# Patient Record
Sex: MALE | Race: WHITE | NOT HISPANIC OR LATINO | Employment: OTHER | URBAN - NONMETROPOLITAN AREA
[De-identification: names, ages, dates, MRNs, and addresses within clinical notes are randomized per-mention and may not be internally consistent; named-entity substitution may affect disease eponyms.]

---

## 2017-07-25 ENCOUNTER — HISTORY (OUTPATIENT)
Dept: INTERNAL MEDICINE | Facility: OTHER | Age: 62
End: 2017-07-25

## 2017-07-25 ENCOUNTER — OFFICE VISIT - GICH (OUTPATIENT)
Dept: INTERNAL MEDICINE | Facility: OTHER | Age: 62
End: 2017-07-25

## 2017-07-25 DIAGNOSIS — L71.9 ROSACEA: ICD-10-CM

## 2017-07-25 DIAGNOSIS — K21.9 GASTRO-ESOPHAGEAL REFLUX DISEASE WITHOUT ESOPHAGITIS: ICD-10-CM

## 2017-07-25 DIAGNOSIS — F52.8 OTHER SEXUAL DYSFUNCTION NOT DUE TO A SUBSTANCE OR KNOWN PHYSIOLOGICAL CONDITION: ICD-10-CM

## 2017-07-25 DIAGNOSIS — M19.90 OSTEOARTHRITIS: ICD-10-CM

## 2017-07-25 DIAGNOSIS — F17.200 NICOTINE DEPENDENCE, UNCOMPLICATED: ICD-10-CM

## 2017-07-25 DIAGNOSIS — Z00.00 ENCOUNTER FOR GENERAL ADULT MEDICAL EXAMINATION WITHOUT ABNORMAL FINDINGS: ICD-10-CM

## 2017-07-25 LAB
A/G RATIO - HISTORICAL: 1.3 (ref 1–2)
ALBUMIN SERPL-MCNC: 4.1 G/DL (ref 3.5–5.7)
ALP SERPL-CCNC: 79 IU/L (ref 34–104)
ALT (SGPT) - HISTORICAL: 27 IU/L (ref 7–52)
ANION GAP - HISTORICAL: 10 (ref 5–18)
AST SERPL-CCNC: 16 IU/L (ref 13–39)
BILIRUB SERPL-MCNC: 0.8 MG/DL (ref 0.3–1)
BUN SERPL-MCNC: 16 MG/DL (ref 7–25)
BUN/CREAT RATIO - HISTORICAL: 18
CALCIUM SERPL-MCNC: 9.7 MG/DL (ref 8.6–10.3)
CHLORIDE SERPLBLD-SCNC: 103 MMOL/L (ref 98–107)
CHOL/HDL RATIO - HISTORICAL: 7.11
CHOLESTEROL TOTAL: 199 MG/DL
CO2 SERPL-SCNC: 24 MMOL/L (ref 21–31)
CREAT SERPL-MCNC: 0.91 MG/DL (ref 0.7–1.3)
GFR IF NOT AFRICAN AMERICAN - HISTORICAL: >60 ML/MIN/1.73M2
GLOBULIN - HISTORICAL: 3.1 G/DL (ref 2–3.7)
GLUCOSE SERPL-MCNC: 98 MG/DL (ref 70–105)
HDLC SERPL-MCNC: 28 MG/DL (ref 23–92)
LDLC SERPL CALC-MCNC: 132 MG/DL
NON-HDL CHOLESTEROL - HISTORICAL: 171 MG/DL
PATIENT STATUS - HISTORICAL: ABNORMAL
POTASSIUM SERPL-SCNC: 4.2 MMOL/L (ref 3.5–5.1)
PROT SERPL-MCNC: 7.2 G/DL (ref 6.4–8.9)
PSA TOTAL (DIAGNOSTIC) - HISTORICAL: 1.29 NG/ML
SODIUM SERPL-SCNC: 137 MMOL/L (ref 133–143)
TRIGL SERPL-MCNC: 194 MG/DL

## 2017-07-26 LAB — HEPATITIS C ANTIBODY CATEGORY - HISTORICAL: NORMAL

## 2017-11-07 ENCOUNTER — HISTORY (OUTPATIENT)
Dept: EMERGENCY MEDICINE | Facility: OTHER | Age: 62
End: 2017-11-07

## 2017-11-08 ENCOUNTER — OFFICE VISIT - GICH (OUTPATIENT)
Dept: ORTHOPEDICS | Facility: OTHER | Age: 62
End: 2017-11-08

## 2017-11-08 ENCOUNTER — HISTORY (OUTPATIENT)
Dept: ORTHOPEDICS | Facility: OTHER | Age: 62
End: 2017-11-08

## 2017-11-08 DIAGNOSIS — S82.291A OTHER FRACTURE OF SHAFT OF RIGHT TIBIA, INITIAL ENCOUNTER FOR CLOSED FRACTURE: ICD-10-CM

## 2017-11-10 ENCOUNTER — AMBULATORY - GICH (OUTPATIENT)
Dept: SCHEDULING | Facility: OTHER | Age: 62
End: 2017-11-10

## 2017-11-17 ENCOUNTER — OFFICE VISIT - GICH (OUTPATIENT)
Dept: ORTHOPEDICS | Facility: OTHER | Age: 62
End: 2017-11-17

## 2017-11-17 ENCOUNTER — HISTORY (OUTPATIENT)
Dept: ORTHOPEDICS | Facility: OTHER | Age: 62
End: 2017-11-17

## 2017-11-17 ENCOUNTER — HOSPITAL ENCOUNTER (OUTPATIENT)
Dept: RADIOLOGY | Facility: OTHER | Age: 62
End: 2017-11-17
Attending: ORTHOPAEDIC SURGERY

## 2017-11-17 DIAGNOSIS — M89.8X6 OTHER SPECIFIED DISORDERS OF BONE, LOWER LEG: ICD-10-CM

## 2017-11-17 DIAGNOSIS — S82.291D: ICD-10-CM

## 2017-11-22 ENCOUNTER — AMBULATORY - GICH (OUTPATIENT)
Dept: ORTHOPEDICS | Facility: OTHER | Age: 62
End: 2017-11-22

## 2017-11-22 DIAGNOSIS — M89.8X6 OTHER SPECIFIED DISORDERS OF BONE, LOWER LEG: ICD-10-CM

## 2017-11-29 ENCOUNTER — HOSPITAL ENCOUNTER (OUTPATIENT)
Dept: RADIOLOGY | Facility: OTHER | Age: 62
End: 2017-11-29
Attending: ORTHOPAEDIC SURGERY

## 2017-11-29 ENCOUNTER — HISTORY (OUTPATIENT)
Dept: ORTHOPEDICS | Facility: OTHER | Age: 62
End: 2017-11-29

## 2017-11-29 ENCOUNTER — OFFICE VISIT - GICH (OUTPATIENT)
Dept: ORTHOPEDICS | Facility: OTHER | Age: 62
End: 2017-11-29

## 2017-11-29 DIAGNOSIS — S82.291D: ICD-10-CM

## 2017-11-29 DIAGNOSIS — M89.8X6 OTHER SPECIFIED DISORDERS OF BONE, LOWER LEG: ICD-10-CM

## 2017-12-13 ENCOUNTER — AMBULATORY - GICH (OUTPATIENT)
Dept: ORTHOPEDICS | Facility: OTHER | Age: 62
End: 2017-12-13

## 2017-12-13 DIAGNOSIS — M89.8X6 OTHER SPECIFIED DISORDERS OF BONE, LOWER LEG: ICD-10-CM

## 2017-12-26 ENCOUNTER — OFFICE VISIT - GICH (OUTPATIENT)
Dept: ORTHOPEDICS | Facility: OTHER | Age: 62
End: 2017-12-26

## 2017-12-26 ENCOUNTER — HISTORY (OUTPATIENT)
Dept: ORTHOPEDICS | Facility: OTHER | Age: 62
End: 2017-12-26

## 2017-12-26 ENCOUNTER — HOSPITAL ENCOUNTER (OUTPATIENT)
Dept: RADIOLOGY | Facility: OTHER | Age: 62
End: 2017-12-26
Attending: ORTHOPAEDIC SURGERY

## 2017-12-26 DIAGNOSIS — M89.8X6 OTHER SPECIFIED DISORDERS OF BONE, LOWER LEG: ICD-10-CM

## 2017-12-26 DIAGNOSIS — S82.291D: ICD-10-CM

## 2017-12-27 NOTE — PROGRESS NOTES
Patient Information     Patient Name MRN Sex Martin Burgess [id number] Male 1955      Progress Notes by Matt Velásquez DO at 2017  7:45 AM     Author:  Matt Velásquez DO Service:  (none) Author Type:  PHYS- Osteopathic     Filed:  2017  8:29 AM Encounter Date:  2017 Status:  Signed     :  Matt Velásquez DO (PHYS- Osteopathic)            Martin Bryant was seen in consultation for Dr. Torres from the emergency room for a chief complaint of right lower leg injury     CHIEF COMPLAINT: Martin Bryant is a 62 y.o.  male  Chief Complaint     Patient presents with       Consult      Tib fracture doi-17       HISTORY OF PRESENTING INJURY:  62-year-old male relates he was on a ladder yesterday that was up against a tree. He was trying to move a bird feeder. The ladder slipped and he ended up falling. Both lower legs banged against the ladder. Pain to the right lower leg. Presented to the emergency room and x-rays demonstrated a nondisplaced fracture of the proximal third tibia with no angulation. The fibula was intact. The patient also had a small abrasion to the left lower leg shin area. He was put in a above knee Ortho-Glass splint for the right leg. Using crutches. Ice and elevation during the night.  Minimal to no pain complaint of the right lower leg or calf. The patient has been moving the toes. He also had some increased soreness of the left shoulder. The patient has been seen previously by Dr. Merino for his shoulder concerns and was told that was worn out. The patient had a prescription for pain medication but didn't need to take any last night.    REVIEW OF SYSTEMS:  Constitutional:  Denies constitutional problems  Cardiovascular: normal  Respiratory: normal    The review of systems as documented in the HPI and on the intake questionnaire, completed by the patient 2017, have been reviewed by myself and the pertinent positives and negatives addressed.  The  "remainder of the complete review of systems was non-contributory.    (PFSH) PAST, FAMILY, and/or SOCIAL HISTORY:    PAST MEDICAL HISTORY:  Past Medical History:     Diagnosis  Date     Dyspepsia      ERECTILE DYSFUNCTION, NON-ORGANIC      Rosacea      TOBACCO ABUSE        PAST SURGICAL HISTORY:  Past Surgical History:      Procedure  Laterality Date     HERNIA REPAIR      Bilateral       PAROTIDECTOMY Left 2012    Benign         ALLERGIES:  No Known Allergies    CURRENT MEDICATIONS:  Current Outpatient Prescriptions       Medication  Sig Dispense Refill     glucosamine-chondroitin, 500-400 mg, (COSAMIN /400) 500-400 mg cap Take 1 capsule by mouth 3 times daily.  0     omeprazole (PRILOSEC) 20 mg capsule Take 1 capsule by mouth once daily before a meal.  0     oxyCODONE-acetaminophen, 5-325 mg, (PERCOCET) 5-325 mg per tablet Take 1-2 tablets by mouth every 4 hours if needed  for Pain Max acetaminophen dose: 4000mg in 24 hrs. 15 tablet 0     No current facility-administered medications for this visit.      Medications have been reviewed by me and are current to the best of my knowledge and ability.      FAMILY HISTORY:  Family History       Problem   Relation Age of Onset     Cancer-prostate  Father      Cancer  Sister      Bone         Additional [facility] information documented on the intake form completed by the patient 11/8/2017 was reviewed by myself.    PHYSICAL EXAM:   Ht 1.676 m (5' 6\")  Wt 79.8 kg (176 lb)  BMI 28.41 kg/m2 Body mass index is 28.41 kg/(m^2).    General Appearance: Pleasant male in good appearance, mood and affect.  Alert and orientated times three ( time, date and location).    Examination of Right leg     Examination of the right leg demonstrates a posterior splint extending above the knee and down to the foot. The knee is slightly flexed. Appears well padded.   No complaint of knee pain with palpation around the patella or sides of the knee.   No complaint of calf pain with palpation " around the side of the splint.   I did not remove the splint at this time.   Comfortable active motion of the toes.   Sensory intact.   Capillary refill less than 2 seconds.     Left lower leg demonstrates a small superficial abrasion around the mid anterior tibia. Comfortable knee motion and ankle motion.     Xray/MRI/MRA:  Radiographic images where independently reviewed and discussed with the patient.   Attending Doctor: MALU TUCKER ([id number])  :       ESTELLE WOOD ([id number])  Report Date:       11/07/2017 11:18:09  Report Status:       Final  ======================= Begin of Report Content ======================    PROCEDURE: XR TIBIA AND FIBULA 2 VIEWS RIGHT  HISTORY: FALL; LEG PAIN/PROBLEM; SHOULDER PAIN/PROBLEM; .  COMPARISON: None.  TECHNIQUE: 2 views of the right tibia and fibula were obtained.  FINDINGS: There is a nondisplaced fracture of the proximal tibia with transverse and vertical components. No additional fractures are seen.  IMPRESSION: Nondisplaced proximal tibial fracture.  Electronically Signed By: Estelle Wood M.D. on 11/7/2017 11:13 AM    IMPRESSION:  Right proximal third tibial fracture, stellate pattern, nondisplaced, no angulation.  History of fall from a ladder about 6-8 feet in height, yesterday.  Superficial left lower leg abrasion    PLAN:  The patient was left in the current splint this morning.  Recommend continued use of crutches and balance weight only on the foot.  Recommend referral to [facility] orthotics for application of a right long leg brace which can be hinged at the knee. Include a foot and ankle section to provide rotational stability.  Continue to ice and elevate the fracture site.  Discussed nonsurgical management.  Recheck progress in 1 week and also check the brace at that time.  Discussed the use of over-the-counter nonnarcotic medication.  Questions answered  plan for limited weight-bearing at least 6-8 weeks. Also plan to continue the brace during that  time pending healing on x-ray. May require additional time in the brace.    Matt Velásquez D.O., FDARLINE.O.AColinO.  Orthopedic Surgeon    [address] [address]  Phone [phone]  Fax [phone]    8:03 AM 11/8/2017

## 2017-12-27 NOTE — PROGRESS NOTES
"Patient Information     Patient Name MRN Sex Martin Burgess [id number] Male 1955      Progress Notes by Matt Velásquez DO at 2017  9:00 AM     Author:  Matt Velásquez DO Service:  (none) Author Type:  PHYS- Osteopathic     Filed:  2017 12:41 PM Encounter Date:  2017 Status:  Signed     :  Matt Velásquez DO (PHYS- Osteopathic)            PROGRESS NOTE    SUBJECTIVE:  Martin Bryant is here for recheck of a right lower leg tibia fracture.    HPI: History of injury 10 days ago. The patient presents with his long leg brace with full extension. Patient relates minimal discomfort to the fracture site area. He has been wearing the brace. Patient relates she was informed he could take the lower portion off at nighttime but he does have some discomfort because he might turn his foot. .  Otherwise doing okay with the brace on.     Review of Systems:  Constitutional: Denies constitutional problems    PFSH:  No change in information. See earlier PFSH questionnaire completed by the patient on initial visit.    OBJECTIVE:  /78  Pulse 69  Ht 1.676 m (5' 6\")  Wt 79.8 kg (176 lb)  BMI 28.41 kg/m2 Body mass index is 28.41 kg/(m^2).  Patient is alert and orientated x3 and answers questions appropriately.    Knee exam: Left   mild swelling to the left lower leg. The brace appears to be fitting well.    Radiographic images where independently reviewed and discussed with the patient.   X RAY: X-rays today demonstrates the proximal third tibial shaft fracture with no significant displacement or angulation.    ASSESSMENT:  10 days status post right tibial shaft fracture, proximal third with no displacement or angulation.  Currently in a long leg brace    PLAN:  Recommend trying to leave the lower foot section on even at nighttime provided there is no irritation or rubbing to the skin.  This would provide more rotational stability and safety for the lower leg.  Patient thinks he can " leave it on at nighttime as well.  Plan to recheck and x-ray in 2 weeks. Continue to keep weight off the foot.  Continue with a long-leg brace.    Matt Velásquez D.O.  Orthopedic Surgeon    [address] [address]  Phone [phone]  Fax [phone]    11:39 AM 11/17/2017

## 2017-12-28 NOTE — PROGRESS NOTES
"Patient Information     Patient Name MRN Sex Martin Burgess [id number] Male 1955      Progress Notes by Matt Velásquez DO at 2017  3:30 PM     Author:  Matt Velásquez DO Service:  (none) Author Type:  PHYS- Osteopathic     Filed:  2017  3:58 PM Encounter Date:  2017 Status:  Signed     :  Matt Velásquez DO (PHYS- Osteopathic)            PROGRESS NOTE    SUBJECTIVE:  Martin Bryant is here for recheck of a right tibia fracture    HPI: Approximately 3 weeks after a right proximal third tibial shaft fracture. Injury on 17. Recheck and x-ray today. The patient has been wearing the long-leg brace all the time. No significant pain complaint to the leg. Using crutches and restricted weightbearing on the left foot. Mostly balance weight. .    Review of Systems:  Constitutional: Denies constitutional problems    PFSH:  No change in information. See earlier PFSH questionnaire completed by the patient on initial visit.    OBJECTIVE:  /88  Pulse 88  Ht 1.676 m (5' 6\")  Wt 79.8 kg (176 lb)  BMI 28.41 kg/m2 Body mass index is 28.41 kg/(m^2).  Patient is alert and orientated x3 and answers questions appropriately.    Left lower leg:  Mild soft tissue swelling over the proximal third of the anterior tibia. No pain with palpation. Comfortable knee motion. The brace was removed. The calf is supple. Comfortable ankle motion. Good alignment of the leg.    Radiographic images where independently reviewed and discussed with the patient.   X RAY: X-ray of the right tibia and fibula demonstrates the proximal third tibial shaft fracture with no displacement or angulation. Stellate fracture pattern. Small amount of callus formation along the fracture site on one of the x-rays.    ASSESSMENT:  3 weeks status post right proximal tibial shaft fracture. Stellate fracture pattern in good alignment.  Clinically the patient appears to be doing well with the long-leg brace and foot " extension.    PLAN:  Continue with the current long-leg brace. Continue with crutches.  Balance weight on the right foot until recheck in about 4 weeks.  Recheck and x-ray in 4 weeks right tibia.  Possible progression of weightbearing at that point but will continue the brace.    Matt Velásquez D.O.  Orthopedic Surgeon    [address] [address]  Phone [phone]  Fax [phone]    3:54 PM 11/29/2017

## 2017-12-28 NOTE — PROGRESS NOTES
Patient Information     Patient Name MRN Martin Chambers [id number] Male 1955      Progress Notes by Obi Khan MD at 2017 12:40 PM     Author:  Obi Khan MD Service:  (none) Author Type:  Physician     Filed:  2017  1:27 PM Encounter Date:  2017 Status:  Signed     :  Obi Khan MD (Physician)            SUBJECTIVE:    Martin Bryant is a 62 y.o. male who presents for comprehensive review of their multiple medical problems and review of medications, renewal of medications and update on necessary health maintenance issues.      HPI Comments: He is here today for complete evaluation. He has not been in for some time to have things checked. He feels well in most respects. One of his biggest problems is with his shoulders and other generalized arthritis. He does take glucosamine which may be helps a little bit but not completely.    He continues to smoke cigarettes, 1 pack per day. I recommended that he consider screening CT scan for lung cancer. He will consider this. Of course I also recommended that he quit smoking. He has never had a colonoscopy. In addition, he has fairly steady and regular problems with reflux disease that is controlled with omeprazole. I told him that I recommended that he have a colonoscopy as well as EGD for further evaluation. He will consider this.    Other than that he feels well with no complaints or concerns. He would like to have some lab work done. He is due for a tetanus booster. I spent time today reconciling medications as well as updating his past medical history, past surgical history, family history and social history.      No Known Allergies,   Current Outpatient Prescriptions     Medication  Sig     glucosamine-chondroitin, 500-400 mg, (COSAMIN /400) 500-400 mg cap Take 1 capsule by mouth 3 times daily.     omeprazole (PRILOSEC) 20 mg capsule Take 1 capsule by mouth once daily before a meal.     No current  facility-administered medications for this visit.      Medications have been reviewed by me and are current to the best of my knowledge and ability. ,   Past Medical History:     Diagnosis  Date     Dyspepsia      ERECTILE DYSFUNCTION, NON-ORGANIC      Rosacea      TOBACCO ABUSE    ,   Patient Active Problem List      Diagnosis Date Noted     Gastroesophageal reflux disease without esophagitis 2017     Osteoarthritis 2017     Tear of right rotator cuff 2015     Left rotator cuff tear 2015     TOBACCO ABUSE      ROSACEA      ERECTILE DYSFUNCTION, NON-ORGANIC    ,   Past Surgical History:      Procedure  Laterality Date     HERNIA REPAIR      Bilateral       PAROTIDECTOMY Left     Benign      and   Social History       Substance Use Topics         Smoking status:   Current Every Day Smoker     Packs/day:  1.00     Years:  40.00     Types:  Cigarettes     Smokeless tobacco:   Never Used     Alcohol use   No      Comment: rare use.      Family Status     Relation  Status     Father  at age 70s     Mother Alive     Sister      Sister Alive     Sister Alive     Social History     Social History        Marital status:       Spouse name: N/A     Number of children:  N/A     Years of education:  N/A     Social History Main Topics         Smoking status:   Current Every Day Smoker     Packs/day:  1.00     Years:  40.00     Types:  Cigarettes     Smokeless tobacco:   Never Used     Alcohol use   No      Comment: rare use.      Drug use:   No     Sexual activity:   Not on file     Other Topics  Concern     Not on file      Social History Narrative      and works at [employer].  Grown children and 2 grandchildren.    Tobacco use 1 ppd - ongoing.       REVIEW OF SYSTEMS:  Review of Systems   Constitutional: Negative for chills, diaphoresis, fever, malaise/fatigue and weight loss.   HENT: Negative for congestion, ear pain, nosebleeds, sore throat and tinnitus.    Eyes:  "Negative for blurred vision, double vision, photophobia, pain, discharge and redness.   Respiratory: Negative for cough, hemoptysis, sputum production, shortness of breath and wheezing.    Cardiovascular: Negative for chest pain, palpitations, orthopnea, claudication, leg swelling and PND.   Gastrointestinal: Negative for abdominal pain, blood in stool, constipation, diarrhea, heartburn, nausea and vomiting.   Genitourinary: Negative for dysuria, flank pain and hematuria.   Musculoskeletal: Negative for back pain, joint pain, myalgias and neck pain.   Skin: Negative for itching and rash.   Neurological: Negative for dizziness, tingling, tremors, speech change, loss of consciousness, weakness and headaches.   Psychiatric/Behavioral: Negative for depression, hallucinations, memory loss, substance abuse and suicidal ideas. The patient is not nervous/anxious.        OBJECTIVE:  /64  Pulse 63  Ht 1.68 m (5' 6.14\")  Wt 80.2 kg (176 lb 12.8 oz)  BMI 28.41 kg/m2    EXAM:   Physical Exam   Constitutional: He is oriented to person, place, and time and well-developed, well-nourished, and in no distress. No distress.   HENT:   Head: Normocephalic and atraumatic.   Right Ear: Tympanic membrane and external ear normal.   Left Ear: Tympanic membrane and external ear normal.   Nose: Nose normal.   Mouth/Throat: Oropharynx is clear and moist and mucous membranes are normal. No oropharyngeal exudate.   Eyes: Conjunctivae are normal. Pupils are equal, round, and reactive to light. No scleral icterus.   Neck: Normal range of motion. Neck supple. Normal carotid pulses, no hepatojugular reflux and no JVD present. Carotid bruit is not present. No tracheal deviation and no edema present. No thyroid mass and no thyromegaly present.   Cardiovascular: Normal rate, regular rhythm, normal heart sounds and intact distal pulses.  Exam reveals no gallop and no friction rub.    No murmur heard.  Pulmonary/Chest: Effort normal. No " respiratory distress. He has decreased breath sounds. He has no wheezes. He has no rhonchi. He has no rales. He exhibits no tenderness.   Abdominal: Soft. Bowel sounds are normal. He exhibits no distension and no mass. There is no hepatosplenomegaly. There is no tenderness. There is no rebound and no guarding. Hernia confirmed negative in the right inguinal area and confirmed negative in the left inguinal area.   Genitourinary: Rectum normal, prostate normal, testes/scrotum normal and penis normal.   Genitourinary Comments: Prostate 2+, no nodularity   Musculoskeletal: Normal range of motion. He exhibits no edema or tenderness.   Lymphadenopathy:     He has no cervical adenopathy.   Neurological: He is alert and oriented to person, place, and time. He has normal motor skills. He displays normal reflexes. No cranial nerve deficit. He exhibits normal muscle tone. Gait normal. Coordination normal.   Skin: Skin is warm and dry. No rash noted. He is not diaphoretic. No cyanosis or erythema. No pallor. Nails show no clubbing.   Psychiatric: Mood, memory, affect and judgment normal.   Nursing note and vitals reviewed.      ASSESSMENT/PLAN:    ICD-10-CM    1. Gastroesophageal reflux disease without esophagitis K21.9 OMNI TDAP VACCINE IM   2. TOBACCO ABUSE F17.200    3. Osteoarthritis, unspecified osteoarthritis type, unspecified site M19.90    4. Rosacea L71.9    5. ERECTILE DYSFUNCTION, NON-ORGANIC F52.8    6. Health care maintenance Z00.00 COMPLETE METABOLIC PANEL      LIPID PANEL      ANTI HCV      PSA, TOTAL      URINALYSIS W REFLEX MICROSCOPIC IF POSITIVE        Plan:  He appears to be stable at this point in time. Exam is fairly unremarkable. Boostrix given today. Complete lab drawn and pending, I will send him a letter with the results. Strongly encouraged him to discontinue smoking. Recommended CT scan for lung cancer screening. Recommended EGD as well as colonoscopy. He will discuss these recommendations with his  wife and will let me know if he would like me to order them.

## 2017-12-29 ENCOUNTER — AMBULATORY - GICH (OUTPATIENT)
Dept: SCHEDULING | Facility: OTHER | Age: 62
End: 2017-12-29

## 2017-12-30 NOTE — NURSING NOTE
Patient Information     Patient Name MRN Martin Chambers [id number] Male 1955      Nursing Note by Gosselin, Norma J at 2017  7:45 AM     Author:  Gosselin, Norma J Service:  (none) Author Type:  (none)     Filed:  2017  7:59 AM Encounter Date:  2017 Status:  Signed     :  Gosselin, Norma J            Consult right leg injury. doi-17  Norma J Gosselin LPN....................  2017   7:58 AM

## 2017-12-30 NOTE — NURSING NOTE
Patient Information     Patient Name MRN Martin Chambers [id number] Male 1955      Nursing Note by Chaya Fabian at 2017 12:40 PM     Author:  Chaya Fabian Service:  (none) Author Type:  (none)     Filed:  2017 12:55 PM Encounter Date:  2017 Status:  Signed     :  Chaya Fabian            Pt is here for yearly physical  Chaya Fabian LPN 2017 12:46 PM

## 2017-12-30 NOTE — NURSING NOTE
Patient Information     Patient Name MRN Sex Martin Burgess [id number] Male 1955      Nursing Note by Gosselin, Norma J at 2017  3:30 PM     Author:  Gosselin, Norma J Service:  (none) Author Type:  (none)     Filed:  2017  3:19 PM Encounter Date:  2017 Status:  Signed     :  Gosselin, Norma J            Follow up Right Tib/Fib DOI-17  Norma J Gosselin LPN....................  2017   3:18 PM

## 2017-12-30 NOTE — NURSING NOTE
Patient Information     Patient Name MRN Martin Chambers [id number] Male 1955      Nursing Note by Chaya Fabian at 2017  9:00 AM     Author:  Chaya Fabian Service:  (none) Author Type:  (none)     Filed:  2017  8:58 AM Encounter Date:  2017 Status:  Signed     :  Chaya Fabian            Patient is here today for follow up of right tibia fracture   DOI 17  Chaya Fabian LPN 2017 8:57 AM

## 2018-01-18 ENCOUNTER — AMBULATORY - GICH (OUTPATIENT)
Dept: ORTHOPEDICS | Facility: OTHER | Age: 63
End: 2018-01-18

## 2018-01-18 DIAGNOSIS — S82.291D: ICD-10-CM

## 2018-01-27 VITALS
DIASTOLIC BLOOD PRESSURE: 64 MMHG | HEIGHT: 66 IN | SYSTOLIC BLOOD PRESSURE: 128 MMHG | BODY MASS INDEX: 28.42 KG/M2 | HEART RATE: 63 BPM | WEIGHT: 176.8 LBS

## 2018-01-27 VITALS
HEIGHT: 66 IN | HEART RATE: 69 BPM | BODY MASS INDEX: 28.28 KG/M2 | DIASTOLIC BLOOD PRESSURE: 78 MMHG | WEIGHT: 176 LBS | SYSTOLIC BLOOD PRESSURE: 130 MMHG

## 2018-01-27 VITALS
HEART RATE: 88 BPM | HEIGHT: 66 IN | SYSTOLIC BLOOD PRESSURE: 136 MMHG | DIASTOLIC BLOOD PRESSURE: 88 MMHG | WEIGHT: 176 LBS | BODY MASS INDEX: 28.28 KG/M2

## 2018-01-27 VITALS
HEART RATE: 68 BPM | SYSTOLIC BLOOD PRESSURE: 110 MMHG | WEIGHT: 176 LBS | BODY MASS INDEX: 28.28 KG/M2 | DIASTOLIC BLOOD PRESSURE: 60 MMHG | HEIGHT: 66 IN

## 2018-01-31 ENCOUNTER — AMBULATORY - GICH (OUTPATIENT)
Dept: SCHEDULING | Facility: OTHER | Age: 63
End: 2018-01-31

## 2018-01-31 ENCOUNTER — HISTORY (OUTPATIENT)
Dept: ORTHOPEDICS | Facility: OTHER | Age: 63
End: 2018-01-31

## 2018-01-31 ENCOUNTER — OFFICE VISIT - GICH (OUTPATIENT)
Dept: ORTHOPEDICS | Facility: OTHER | Age: 63
End: 2018-01-31

## 2018-01-31 ENCOUNTER — HOSPITAL ENCOUNTER (OUTPATIENT)
Dept: RADIOLOGY | Facility: OTHER | Age: 63
End: 2018-01-31
Attending: ORTHOPAEDIC SURGERY

## 2018-01-31 DIAGNOSIS — S82.291D: ICD-10-CM

## 2018-02-02 ENCOUNTER — AMBULATORY - GICH (OUTPATIENT)
Dept: SCHEDULING | Facility: OTHER | Age: 63
End: 2018-02-02

## 2018-02-09 VITALS
HEART RATE: 80 BPM | WEIGHT: 176 LBS | HEIGHT: 66 IN | BODY MASS INDEX: 28.28 KG/M2 | SYSTOLIC BLOOD PRESSURE: 134 MMHG | DIASTOLIC BLOOD PRESSURE: 84 MMHG

## 2018-02-09 VITALS
DIASTOLIC BLOOD PRESSURE: 80 MMHG | HEART RATE: 88 BPM | SYSTOLIC BLOOD PRESSURE: 128 MMHG | WEIGHT: 176 LBS | BODY MASS INDEX: 28.41 KG/M2

## 2018-02-12 NOTE — PROGRESS NOTES
"Patient Information     Patient Name MRN Sex Martin Burgess [id number] Male 1955      Progress Notes by Matt Velásquez DO at 2017 10:15 AM     Author:  Matt Velásquez DO Service:  (none) Author Type:  PHYS- Osteopathic     Filed:  2017 10:39 AM Encounter Date:  2017 Status:  Signed     :  Matt Velásquez DO (PHYS- Osteopathic)            PROGRESS NOTE    SUBJECTIVE:  Martin Bryant is here for recheck of a right tibia fracture.     HPI: History of injury about 7 weeks ago on . Patient has been treated nonsurgically for a nondisplaced fracture of the proximal third of the tibia. Using his knee brace. Mostly balance weight on the foot at times. Otherwise using crutches. . decrease in soft tissue swelling of the shin area.   No pain complaint to the fracture site.   Comfortable with knee movement today. X-rays taken.     Review of Systems:  Constitutional: Denies constitutional problems    PFSH:  No change in information. See earlier PFSH questionnaire completed by the patient on initial visit.    OBJECTIVE:  /84 (Cuff Site: Right Arm, Position: Sitting, Cuff Size: Adult Regular)  Pulse 80  Ht 1.676 m (5' 6\")  Wt 79.8 kg (176 lb)  BMI 28.41 kg/m2 Body mass index is 28.41 kg/(m^2).  Patient is alert and orientated x3 and answers questions appropriately.    Knee exam: Right   examination of the right knee demonstrates no effusion or redness.  Right tibia and lower leg demonstrates good alignment. Decrease in soft tissue swelling over the anterior tibia and lower leg.  No significant pain with palpation around the proximal third of the tibia at the fracture site.  Mild lower extremity edema.    Radiographic images where independently reviewed and discussed with the patient.   X RAY: X-rays today compared to previous films demonstrates increased callus formation and healing at the fracture site. Some increased resorption at the fracture site as " well.  Increased callus seen on multiple views. Good alignment with no displacement or angulation.    ASSESSMENT:  7 weeks right proximal third tibia shaft fracture, nondisplaced  increased healing and callus on x-ray comparing to prior films. Still good alignment.    PLAN:  Continue with the functional brace. I open the brace and currently set at 10-80  range of motion. Patient is comfortable in the chair bending the knee past 90  today.  Recommendations to continue with the knee brace at all times.  Okay for balance weight and gradually progress to partial weightbearing but definitely still use the crutches and no full weightbearing on the foot at this time.    The patient's wife relates they were told it would be healed in 6-8 weeks and she expressed displeasure that he is not all better yet and she needs to do the work at home that he normally takes care of.  Discussed with the patient and his wife he has good healing callus on x-ray at 7 weeks today but the fracture is not fully healed. He still needs to use the brace and recommend keeping most of the weight off the foot.   He should have increased healing at about 12 weeks after injury and hopefully should be able to put more weight on the foot at that time. However fracture takes additional months for continued healing.  At this time we want to avoid twisting or turning on it to avoid any displacement or angulation to occur.  Patient relates he has some paperwork that he needs to drop off.  Recommend recheck and x-ray of the right tibia in 4-5 weeks.  Questions answered.    Matt Velásquez D.O.  Orthopedic Surgeon    [address] [place] [address]  Phone [phone]  Fax [phone]    10:30 AM 12/26/2017

## 2018-02-13 NOTE — NURSING NOTE
Patient Information     Patient Name MRN Martin Chambers [id number] Male 1955      Nursing Note by Amber Hudson at 2018  3:30 PM     Author:  Amber Hudson Service:  (none) Author Type:  (none)     Filed:  2018  3:15 PM Encounter Date:  2018 Status:  Signed     :  Amber Hudson            Patient is here for a follow up on his right tib/fib injury.  DOI: 17  Amber Hudson LPN .......2018 3:15 PM

## 2018-02-13 NOTE — PROGRESS NOTES
Patient Information     Patient Name MRN Sex Martin Burgess [id number] Male 1955      Progress Notes by Matt Velásquez DO at 2018  3:30 PM     Author:  Matt Velásquez DO Service:  (none) Author Type:  PHYS- Osteopathic     Filed:  2018  4:00 PM Encounter Date:  2018 Status:  Signed     :  Matt Velásquez DO (PHYS- Osteopathic)            PROGRESS NOTE    SUBJECTIVE:  Martin Bryant is here for recheck of a right proximal third tibia fracture    HPI: 12 weeks status post right proximal tibial shaft fracture, nondisplaced. Treated nonsurgically with immobilization.   The patient has been using the long-leg hinged knee brace with extension down to the foot.   No complaint of pain to the fracture site. Comfortable with the knee.   The patient has been limiting weightbearing on the foot. Partial weightbearing but on occasion puts full weight on it without crutches. Comfortable with no pain. .    The patient has been off work at this time because of the injury. 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     Review of Systems:  Constitutional: Denies constitutional problems    PFSH:  No change in information. See earlier PFSH questionnaire completed by the patient on initial visit.    OBJECTIVE:  /80 (Cuff Site: Right Arm, Position: Sitting, Cuff Size: Adult Regular)  Pulse 88  Wt 79.8 kg (176 lb)  BMI 28.41 kg/m2 Body mass index is 28.41 kg/(m^2).  Patient is alert and orientated x3 and answers questions appropriately.    Right lower leg:  Mild to moderate soft tissue swelling over the proximal middle third of the tibia. No pain with palpation. The calf is supple.  Comfortable active and passive motion of the right ankle and knee.    Radiographic images where independently reviewed and discussed with the patient.   X RAY: X-rays of the right tibia and fibula  today demonstrates increased healing at the fracture site on multiple views with cortical callus and thickening. Fracture is still visible.    Attending Doctor: ALINA BLANCO ([id number])  :       BOLA CLANCY ([id number])  Report Date:       01/31/2018 15:41:19  Report Status:       Final  ======================= Begin of Report Content ======================    PROCEDURE: XR TIBIA AND FIBULA 2 VIEWS RIGHT  HISTORY: Other closed fracture of shaft of right tibia with routine healing, subsequent encounter.  COMPARISON: 11/7/2017 through 12/26/2017  TECHNIQUE: 4 views of the right tibia and fibula.  FINDINGS: The previously described proximal right tibial diaphyseal fracture is redemonstrated, again with bulky periosteal bone formation. Some endosteal bone information with slight indistinctness of the fracture line is seen, particularly on the sagittal view. No other fracture is seen.  IMPRESSION: Healing proximal right tibial fracture.  Electronically Signed By: Jabier Clancy on 1/31/2018 3:37 PM    ASSESSMENT:  Right proximal third tibial fracture. History of injury 12 weeks ago  increased healing on multiple views.  Overall good alignment with no displacement or angulation.    PLAN:  Patient will continue with the long-leg hinged knee brace with extension down to the foot.  Gradually progress walking activities on the foot and wean from the crutches during the next month.  Recommend using the brace with walking activities.  Okay to remove the brace indoors if comfortable and at nighttime.  Avoid jumping activities. Avoid twisting and turning on the leg.  Okay to progress steps and stairs.    Discussed work requirements. Okay for sitdown work and standing and walking activities but recommend no stair climbing. The patient relates needing to go up and down 50-70 steps at work.  Recommend no steps and stairs at work over the next 2 weeks until he becomes more comfortable with stairs at home.    Patient plans  on dropping off some paperwork to be filled out for work.  Questions answered.  Recheck and x-ray of the right tibia/fibula in 6 weeks.    Matt Velásquez D.O.  Orthopedic Surgeon    [address] [place] [address]  Phone [phone]  Fax [phone]    3:54 PM 1/31/2018

## 2018-02-23 ENCOUNTER — DOCUMENTATION ONLY (OUTPATIENT)
Dept: FAMILY MEDICINE | Facility: OTHER | Age: 63
End: 2018-02-23

## 2018-02-23 PROBLEM — F52.8 PSYCHOSEXUAL DYSFUNCTION WITH INHIBITED SEXUAL EXCITEMENT: Status: ACTIVE | Noted: 2018-02-23

## 2018-02-23 PROBLEM — M19.90 OSTEOARTHROSIS: Status: ACTIVE | Noted: 2017-07-25

## 2018-02-23 PROBLEM — L71.9 ROSACEA: Status: ACTIVE | Noted: 2018-02-23

## 2018-02-23 PROBLEM — K21.9 GASTROESOPHAGEAL REFLUX DISEASE WITHOUT ESOPHAGITIS: Status: ACTIVE | Noted: 2017-07-25

## 2018-02-23 PROBLEM — Z72.0 TOBACCO ABUSE: Status: ACTIVE | Noted: 2018-02-23

## 2018-02-23 PROBLEM — S82.201D CLOSED FRACTURE OF SHAFT OF RIGHT TIBIA WITH ROUTINE HEALING: Status: ACTIVE | Noted: 2017-12-29

## 2018-02-23 PROBLEM — M89.8X6 PAIN OF RIGHT TIBIA: Status: ACTIVE | Noted: 2017-12-13

## 2018-02-23 RX ORDER — SODIUM PHOSPHATE,MONO-DIBASIC 19G-7G/118
1 ENEMA (ML) RECTAL 3 TIMES DAILY
COMMUNITY
Start: 2012-08-30 | End: 2018-06-04

## 2018-03-07 DIAGNOSIS — S82.291D OTHER CLOSED FRACTURE OF SHAFT OF RIGHT TIBIA WITH ROUTINE HEALING, SUBSEQUENT ENCOUNTER: Primary | ICD-10-CM

## 2018-03-19 DIAGNOSIS — S82.291D OTHER CLOSED FRACTURE OF SHAFT OF RIGHT TIBIA WITH ROUTINE HEALING, SUBSEQUENT ENCOUNTER: Primary | ICD-10-CM

## 2018-04-16 DIAGNOSIS — S82.291D OTHER CLOSED FRACTURE OF SHAFT OF RIGHT TIBIA WITH ROUTINE HEALING, SUBSEQUENT ENCOUNTER: Primary | ICD-10-CM

## 2018-04-17 ENCOUNTER — HOSPITAL ENCOUNTER (OUTPATIENT)
Dept: GENERAL RADIOLOGY | Facility: OTHER | Age: 63
Discharge: HOME | End: 2018-04-17
Attending: ORTHOPAEDIC SURGERY | Admitting: ORTHOPAEDIC SURGERY
Payer: COMMERCIAL

## 2018-04-17 ENCOUNTER — OFFICE VISIT (OUTPATIENT)
Dept: ORTHOPEDICS | Facility: OTHER | Age: 63
End: 2018-04-17
Attending: ORTHOPAEDIC SURGERY
Payer: COMMERCIAL

## 2018-04-17 VITALS
HEART RATE: 64 BPM | DIASTOLIC BLOOD PRESSURE: 80 MMHG | SYSTOLIC BLOOD PRESSURE: 124 MMHG | BODY MASS INDEX: 28.28 KG/M2 | HEIGHT: 66 IN | WEIGHT: 176 LBS

## 2018-04-17 DIAGNOSIS — S82.291D OTHER CLOSED FRACTURE OF SHAFT OF RIGHT TIBIA WITH ROUTINE HEALING, SUBSEQUENT ENCOUNTER: ICD-10-CM

## 2018-04-17 DIAGNOSIS — S82.291D: Primary | ICD-10-CM

## 2018-04-17 PROCEDURE — 99213 OFFICE O/P EST LOW 20 MIN: CPT | Performed by: ORTHOPAEDIC SURGERY

## 2018-04-17 PROCEDURE — 73590 X-RAY EXAM OF LOWER LEG: CPT | Mod: RT

## 2018-04-17 ASSESSMENT — PAIN SCALES - GENERAL: PAINLEVEL: NO PAIN (0)

## 2018-04-17 NOTE — PROGRESS NOTES
"PROGRESS NOTE    SUBJECTIVE:  Martin Bryant is here for recheck of a right tibia fracture.     HPI: Patient relates he has been doing well over the past month.  No pain to the right lower leg.  Performing normal outdoor activities.  He has an outdoor woodstove..    Review of Systems:  Constitutional: Denies constitutional problems    PFSH:  No change in information. See earlier PFSH questionnaire completed by the patient on initial visit.    OBJECTIVE:  /80  Pulse 64  Ht 1.676 m (5' 6\")  Wt 79.8 kg (176 lb)  BMI 28.41 kg/m2Body mass index is 28.41 kg/(m^2).  Patient is alert and orientated x3 and answers questions appropriately.    Right lower leg:  Comfortable motion of the right knee.  Ambulates unassisted.  No pain with knee motion.  No significant swelling to the right lower leg.  Comfortable ankle motion.    Radiographic images were independently reviewed and discussed with the patient.   X RAY:    X-rays of the right tibia/fibula today demonstrates increased bridging healing across the fracture site on the tibia with continued good alignment.    ASSESSMENT:  Right proximal third tibia fracture with increased healing on x-ray compared to prior films.  Clinically the patient is doing well.  History of injury November 7, 2017.    PLAN:  Discussion included review of x-rays.  Discussed with the patient increased healing on x-ray.  Good alignment.  Recommendations to continue with a measure of caution regarding outdoor activities.  No additional x-rays ordered at this time.  Questions answered and the patient will follow-up if needed.    Matt Velásquez D.O.  Orthopedic Surgeon    [facility]  [address] [place] [address]  Phone [phone]  Fax [phone]    4/17/2018              "

## 2018-04-17 NOTE — MR AVS SNAPSHOT
"              After Visit Summary   4/17/2018    Martin Bryant    MRN: [id number]           Patient Information     Date Of Birth          1955        Visit Information        Provider Department      4/17/2018 8:30 AM Matt Velásquez DO [facility]        Today's Diagnoses     Other fracture of shaft of right tibia, subsequent encounter for closed fracture with routine healing    -  1       Follow-ups after your visit        Follow-up notes from your care team     Return if symptoms worsen or fail to improve.      Future tests that were ordered for you today     Open Future Orders        Priority Expected Expires Ordered    XR Tibia & Fibula Right 2 Views Routine 4/17/2018 4/16/2019 4/16/2018            Who to contact     If you have questions or need follow up information about today's clinic visit or your schedule please contact [facility] directly at [phone].  Normal or non-critical lab and imaging results will be communicated to you by GenVaulthart, letter or phone within 4 business days after the clinic has received the results. If you do not hear from us within 7 days, please contact the clinic through GenVaulthart or phone. If you have a critical or abnormal lab result, we will notify you by phone as soon as possible.  Submit refill requests through Sometrics or call your pharmacy and they will forward the refill request to us. Please allow 3 business days for your refill to be completed.          Additional Information About Your Visit        MyChart Information     Sometrics lets you send messages to your doctor, view your test results, renew your prescriptions, schedule appointments and more. To sign up, go to [email or web address] . Click on \"Log in\" on the left side of the screen, which will take you to the Welcome page. Then click on \"Sign up Now\" on the right side of the page.     You will be asked to enter the access code listed below, as well as some " "personal information. Please follow the directions to create your username and password.     Your access code is: [email or web address]  Expires: 2018  9:11 AM     Your access code will  in 90 days. If you need help or a new code, please call your [place] clinic or [phone].        Care EveryWhere ID     This is your Care EveryWhere ID. This could be used by other organizations to access your [facility] medical records  [id number]        Your Vitals Were     Pulse Height BMI (Body Mass Index)             64 1.676 m (5' 6\") 28.41 kg/m2          Blood Pressure from Last 3 Encounters:   18 124/80   18 128/80   17 134/84    Weight from Last 3 Encounters:   18 79.8 kg (176 lb)   18 79.8 kg (176 lb)   17 79.8 kg (176 lb)              Today, you had the following     No orders found for display       Primary Care Provider Office Phone # Fax #    Obi Khan [phone] [address] [facility] [address]        Equal Access to Services     [facility] CenterSAAR AH: Hadii alex munizo Somarie, waaxda luqadaha, qaybta kaalmada adeegyada, racquel naqvi. So [facility] [phone].    ATENCIÓN: Si habla español, tiene a garcia disposición servicios gratuitos de asistencia lingüística. Ll[facility] [phone].    We comply with applicable federal civil rights laws and [place] laws. We do not discriminate on the basis of race, color, national origin, age, disability, sex, sexual orientation, or gender identity.            Thank you!     Thank you for choosing [facility] AND [facility]  for your care. Our goal is always to provide you with excellent care. Hearing back from our patients is one way we can continue to improve our services. Please take a few minutes to complete the written survey that you may receive in the mail after your visit with us. Thank you!             Your Updated Medication List - Protect others around you: Learn " how to safely use, store and throw away your medicines at www.disposemymeds.org.          This list is accurate as of 4/17/18  9:11 AM.  Always use your most recent med list.                   Brand Name Dispense Instructions for use Diagnosis    glucosamine-chondroitin 500-400 MG Caps per capsule      Take 1 capsule by mouth 3 times daily        omeprazole 20 MG CR capsule    priLOSEC     Take 20 mg by mouth daily

## 2018-04-23 ENCOUNTER — OFFICE VISIT (OUTPATIENT)
Dept: FAMILY MEDICINE | Facility: OTHER | Age: 63
End: 2018-04-23
Attending: INTERNAL MEDICINE
Payer: COMMERCIAL

## 2018-04-23 VITALS
BODY MASS INDEX: 30.59 KG/M2 | WEIGHT: 189.5 LBS | HEART RATE: 80 BPM | DIASTOLIC BLOOD PRESSURE: 82 MMHG | SYSTOLIC BLOOD PRESSURE: 138 MMHG

## 2018-04-23 DIAGNOSIS — R42 VERTIGO: Primary | ICD-10-CM

## 2018-04-23 LAB
ANION GAP SERPL CALCULATED.3IONS-SCNC: 5 MMOL/L (ref 3–14)
BASOPHILS # BLD AUTO: 0.1 10E9/L (ref 0–0.2)
BASOPHILS NFR BLD AUTO: 0.7 %
BUN SERPL-MCNC: 18 MG/DL (ref 7–25)
CALCIUM SERPL-MCNC: 9.5 MG/DL (ref 8.6–10.3)
CHLORIDE SERPL-SCNC: 104 MMOL/L (ref 98–107)
CO2 SERPL-SCNC: 28 MMOL/L (ref 21–31)
CREAT SERPL-MCNC: 0.87 MG/DL (ref 0.7–1.3)
DIFFERENTIAL METHOD BLD: NORMAL
EOSINOPHIL # BLD AUTO: 0.3 10E9/L (ref 0–0.7)
EOSINOPHIL NFR BLD AUTO: 4.2 %
ERYTHROCYTE [DISTWIDTH] IN BLOOD BY AUTOMATED COUNT: 13.6 % (ref 10–15)
GFR SERPL CREATININE-BSD FRML MDRD: 89 ML/MIN/1.7M2
GLUCOSE SERPL-MCNC: 110 MG/DL (ref 70–105)
HCT VFR BLD AUTO: 48.2 % (ref 40–53)
HGB BLD-MCNC: 16.6 G/DL (ref 13.3–17.7)
IMM GRANULOCYTES # BLD: 0 10E9/L (ref 0–0.4)
IMM GRANULOCYTES NFR BLD: 0.4 %
LYMPHOCYTES # BLD AUTO: 1.6 10E9/L (ref 0.8–5.3)
LYMPHOCYTES NFR BLD AUTO: 19.3 %
MCH RBC QN AUTO: 29.2 PG (ref 26.5–33)
MCHC RBC AUTO-ENTMCNC: 34.4 G/DL (ref 31.5–36.5)
MCV RBC AUTO: 85 FL (ref 78–100)
MONOCYTES # BLD AUTO: 0.5 10E9/L (ref 0–1.3)
MONOCYTES NFR BLD AUTO: 6.6 %
NEUTROPHILS # BLD AUTO: 5.6 10E9/L (ref 1.6–8.3)
NEUTROPHILS NFR BLD AUTO: 68.8 %
PLATELET # BLD AUTO: 257 10E9/L (ref 150–450)
POTASSIUM SERPL-SCNC: 4.3 MMOL/L (ref 3.5–5.1)
RBC # BLD AUTO: 5.68 10E12/L (ref 4.4–5.9)
SODIUM SERPL-SCNC: 137 MMOL/L (ref 134–144)
WBC # BLD AUTO: 8.1 10E9/L (ref 4–11)

## 2018-04-23 PROCEDURE — 85025 COMPLETE CBC W/AUTO DIFF WBC: CPT | Performed by: NURSE PRACTITIONER

## 2018-04-23 PROCEDURE — 36415 COLL VENOUS BLD VENIPUNCTURE: CPT | Performed by: NURSE PRACTITIONER

## 2018-04-23 PROCEDURE — 99213 OFFICE O/P EST LOW 20 MIN: CPT | Performed by: NURSE PRACTITIONER

## 2018-04-23 PROCEDURE — 80048 BASIC METABOLIC PNL TOTAL CA: CPT | Performed by: NURSE PRACTITIONER

## 2018-04-23 RX ORDER — MECLIZINE HYDROCHLORIDE 25 MG/1
25 TABLET ORAL EVERY 6 HOURS PRN
Qty: 30 TABLET | Refills: 0 | Status: SHIPPED | OUTPATIENT
Start: 2018-04-23 | End: 2018-05-11

## 2018-04-23 ASSESSMENT — PAIN SCALES - GENERAL: PAINLEVEL: NO PAIN (0)

## 2018-04-23 NOTE — MR AVS SNAPSHOT
After Visit Summary   4/23/2018    Martin Bryant    MRN: [id number]           Patient Information     Date Of Birth          1955        Visit Information        Provider Department      4/23/2018 10:15 AM Bianka Wynne APRN CNP [facility]        Today's Diagnoses     Vertigo    -  1      Care Instructions      Benign Paroxysmal Positional Vertigo     Your health care provider may move your head in certain ways to treat your BPPV.     Benign paroxysmal positional vertigo (BPPV) is a problem with the inner ear. The inner ear contains the vestibular system. This system is what helps you keep your balance. BPPV causes a feeling of spinning. It is a common problem of the vestibular system.  Understanding the vestibular system  The vestibular system of the ear is made up of very tiny parts. They include the utricle, saccule, and semicircular canals. The utricle is a tiny organ that contains calcium crystals. In some people, the crystals can move into the semicircular canals. When this happens, the system no longer works as it should. This causes BPPV. Benign means it is not life threatening. Paroxysmal means it happens suddenly. Positional means that it happens when you move your head. Vertigo is a feeling of spinning.  What causes BPPV?  Causes include injury to your head or neck. Other problems with the vestibular system may cause BPPV. In many people, the cause of BPPV is not known.  Symptoms of BPPV  You many have repeated feelings of spinning (vertigo). The vertigo usually lasts less than 1 minute. Some movements, such as rolling over in bed, can bring on vertigo.  Diagnosing BPPV  Your primary healthcare provider may diagnose and treat your BPPV. Or you may see an ear, nose, and throat doctor (otolaryngologist). In some cases, you may see a nervous system doctor (neurologist).  The healthcare provider will ask about your symptoms and your medical history. He or she  will examine you. You may have hearing and balance tests. As part of the exam, your healthcare provider may have you move your head and body in certain ways. If you have BPPV, the movements can bring on vertigo. Your provider will also look for abnormal movements of your eyes. You may have other tests to check your vestibular or nervous systems.  Treatment for BPPV  Your healthcare provider may try to move the calcium crystals. This is done by having you move your head and neck in certain ways. This treatment is safe and often works well. You may also be told to do these movements at home. You may still have vertigo for a few weeks. Your healthcare provider will recheck your symptoms, usually in about a month. Special physical therapy may also be part of treatment. In rare cases, surgery may be needed for BPPV that does not go away.     When to call the healthcare provider  Call your healthcare provider right away if you have any of these:    Symptoms that do not go away with treatment    Symptoms that get worse    New symptoms   Date Last Reviewed: 5/1/2017 2000-2017 [employer]. [address] [place] [address]. All rights reserved. This information is not intended as a substitute for professional medical care. Always follow your healthcare professional's instructions.                Follow-ups after your visit        Additional Services     PHYSICAL THERAPY REFERRAL       *This therapy referral will be filtered to a centralized scheduling office at [facility] and the patient will receive a call to schedule an appointment at a [place] location most convenient for them. *     [facility] provides Physical Therapy evaluation and treatment and many specialty services across the [place] system.  If requesting a specialty program, please choose from the list below.    If you have not heard from the scheduling office within 2 business days, please  "call [phone] for all locations, with the exception of [place], please call [phone] and [facility], please call [phone]  Treatment: Evaluation & Treatment  Special Instructions/Modalities: none  Special Programs: Balance/Vestibular    Please be aware that coverage of these services is subject to the terms and limitations of your health insurance plan.  Call member services at your health plan with any benefit or coverage questions.      **Note to Provider:  If you are referring outside of [place] for the therapy appointment, please list the name of the location in the \"special instructions\" above, print the referral and give to the patient to schedule the appointment.                  Who to contact     If you have questions or need follow up information about today's clinic visit or your schedule please contact [facility] directly at [phone].  Normal or non-critical lab and imaging results will be communicated to you by Conformiqhart, letter or phone within 4 business days after the clinic has received the results. If you do not hear from us within 7 days, please contact the clinic through Conformiqhart or phone. If you have a critical or abnormal lab result, we will notify you by phone as soon as possible.  Submit refill requests through [employer] or call your pharmacy and they will forward the refill request to us. Please allow 3 business days for your refill to be completed.          Additional Information About Your Visit        Kind[employer]s Information     [employer] lets you send messages to your doctor, view your test results, renew your prescriptions, schedule appointments and more. To sign up, go to [email or web address] . Click on \"Log in\" on the left side of the screen, which will take you to the Welcome page. Then click on \"Sign up Now\" on the right side of the page.     You will be asked to enter the access code listed below, as well as some personal information. Please follow " the directions to create your username and password.     Your access code is: [email or web address]  Expires: 2018  9:11 AM     Your access code will  in 90 days. If you need help or a new code, please call your [place] clinic or [phone].        Care EveryWhere ID     This is your Care EveryWhere ID. This could be used by other organizations to access your [place] medical records  [id number]        Your Vitals Were     Pulse BMI (Body Mass Index)                80 30.59 kg/m2           Blood Pressure from Last 3 Encounters:   18 138/82   18 124/80   18 128/80    Weight from Last 3 Encounters:   18 189 lb 8 oz (86 kg)   18 176 lb (79.8 kg)   18 176 lb (79.8 kg)              We Performed the Following     Basic metabolic panel     CBC and Differential     PHYSICAL THERAPY REFERRAL          Today's Medication Changes          These changes are accurate as of 18 10:31 AM.  If you have any questions, ask your nurse or doctor.               Start taking these medicines.        Dose/Directions    meclizine 25 MG tablet   Commonly known as:  ANTIVERT   Used for:  Vertigo   Started by:  Bianka Wynne APRN CNP        Dose:  25 mg   Take 1 tablet (25 mg) by mouth every 6 hours as needed for dizziness   Quantity:  30 tablet   Refills:  0            Where to get your medicines      These medications were sent to [id number] - [place], [place] - [address]  [address], [facility] [address]     Phone:  [phone]     meclizine 25 MG tablet                Primary Care Provider Office Phone # Fax #    bOi Khan -[phone] [address] [facility] [address]        Equal Access to Services     [facility] HospitalSARA : Mejia Valladares, waaxda luqadaha, qaybta kaalmada marcelina, racquel naqvi. So [facility] [phone].    ATENCIÓN: Si salena mckeon garcia disposición  servicios gratuitos de asistencia lingüística. Ivan renteria [phone].    We comply with applicable federal civil rights laws and [place] laws. We do not discriminate on the basis of race, color, national origin, age, disability, sex, sexual orientation, or gender identity.            Thank you!     Thank you for choosing [facility] AND [facility]  for your care. Our goal is always to provide you with excellent care. Hearing back from our patients is one way we can continue to improve our services. Please take a few minutes to complete the written survey that you may receive in the mail after your visit with us. Thank you!             Your Updated Medication List - Protect others around you: Learn how to safely use, store and throw away your medicines at www.disposemymeds.org.          This list is accurate as of 4/23/18 10:31 AM.  Always use your most recent med list.                   Brand Name Dispense Instructions for use Diagnosis    glucosamine-chondroitin 500-400 MG Caps per capsule      Take 1 capsule by mouth 3 times daily        meclizine 25 MG tablet    ANTIVERT    30 tablet    Take 1 tablet (25 mg) by mouth every 6 hours as needed for dizziness    Vertigo       omeprazole 20 MG CR capsule    priLOSEC     Take 20 mg by mouth daily

## 2018-04-23 NOTE — NURSING NOTE
Patient presents to clinic today for dizziness. It started yesterday when he woke up and improved throughout the day, but woke up with it again this morning.     Kimberly Paula LPN...................4/23/2018  10:14 AM

## 2018-04-23 NOTE — PROGRESS NOTES
HPI:    Martin Bryant is a 63 year old male who presents to clinic today for dizzy spells. He woke up with sx of dizziness yesterday. This did improve but had sx again this morning when he woke up. No loss of balance. The dizziness has been present constant, has nausea. No recent illnesses. No hx of dizziness in the past. Feels dizziness is worse with position changes such as bending forwards. Rolling over in bed last night made the sx worse. He has not taken anything for sx.     Past Medical History:   Diagnosis Date     Epigastric pain     No Comments Provided     Nicotine dependence, uncomplicated     No Comments Provided     Other sexual dysfunction not due to a substance or known physiological condition     No Comments Provided     Rosacea     No Comments Provided       Past Surgical History:   Procedure Laterality Date     OTHER SURGICAL HISTORY      ,HERNIA REPAIR,Bilateral     OTHER SURGICAL HISTORY      2012,COO8827,PAROTIDECTOMY,Left,Benign       Family History   Problem Relation Age of Onset     Prostate Cancer Father      Cancer-prostate     CANCER Sister      Cancer,Bone       Social History     Social History     Marital status:      Spouse name: N/A     Number of children: N/A     Years of education: N/A     Occupational History     Not on file.     Social History Main Topics     Smoking status: Current Every Day Smoker     Packs/day: 0.25     Years: 40.00     Types: Cigarettes     Smokeless tobacco: Never Used     Alcohol use No      Comment: Alcoholic Drinks/day: rare use.     Drug use: No      Comment: Drug use: No     Sexual activity: Not on file     Other Topics Concern     Not on file     Social History Narrative     and works at [employer].  Grown children and 2 grandchildren.  Tobacco use 1 ppd - ongoing.       Current Outpatient Prescriptions   Medication Sig Dispense Refill     glucosamine-chondroitin 500-400 MG CAPS per capsule Take 1 capsule by mouth 3 times daily        meclizine (ANTIVERT) 25 MG tablet Take 1 tablet (25 mg) by mouth every 6 hours as needed for dizziness 30 tablet 0     omeprazole (PRILOSEC) 20 MG CR capsule Take 20 mg by mouth daily         No Known Allergies    ROS:  Pertinent positives and negatives are noted in HPI.    EXAM:  General appearance: well appearing male, in no acute distress  Head: normocephalic, atraumatic  Ears: TM's with cone of light, no erythema, canals clear bilaterally  Eyes: conjunctivae normal, FLAVIO, EOM   Orophayrnx: moist mucous membranes, tonsils without erythema, exudates or petechiae, no post nasal drip seen  Neck: supple without adenopathy  Respiratory: clear to auscultation bilaterally  Cardiac: RRR with no murmurs  Neuro: CN II-XII intact, normal gait  Psychological: normal affect, alert and pleasant    ASSESSMENT AND PLAN:    1. Vertigo      Discussed differentials including inner ear concerns, BPPV, etc. Exam is benign today. Sx consistent with a positional vertigo. Discussed sx management, meclizine for dizziness. Referred to PT for vestibular tx, if sx resolved may cancel. F/u prn.         Bianka Wynne..................4/23/2018 10:12 AM

## 2018-04-23 NOTE — PATIENT INSTRUCTIONS
Benign Paroxysmal Positional Vertigo     Your health care provider may move your head in certain ways to treat your BPPV.     Benign paroxysmal positional vertigo (BPPV) is a problem with the inner ear. The inner ear contains the vestibular system. This system is what helps you keep your balance. BPPV causes a feeling of spinning. It is a common problem of the vestibular system.  Understanding the vestibular system  The vestibular system of the ear is made up of very tiny parts. They include the utricle, saccule, and semicircular canals. The utricle is a tiny organ that contains calcium crystals. In some people, the crystals can move into the semicircular canals. When this happens, the system no longer works as it should. This causes BPPV. Benign means it is not life threatening. Paroxysmal means it happens suddenly. Positional means that it happens when you move your head. Vertigo is a feeling of spinning.  What causes BPPV?  Causes include injury to your head or neck. Other problems with the vestibular system may cause BPPV. In many people, the cause of BPPV is not known.  Symptoms of BPPV  You many have repeated feelings of spinning (vertigo). The vertigo usually lasts less than 1 minute. Some movements, such as rolling over in bed, can bring on vertigo.  Diagnosing BPPV  Your primary healthcare provider may diagnose and treat your BPPV. Or you may see an ear, nose, and throat doctor (otolaryngologist). In some cases, you may see a nervous system doctor (neurologist).  The healthcare provider will ask about your symptoms and your medical history. He or she will examine you. You may have hearing and balance tests. As part of the exam, your healthcare provider may have you move your head and body in certain ways. If you have BPPV, the movements can bring on vertigo. Your provider will also look for abnormal movements of your eyes. You may have other tests to check your vestibular or nervous systems.  Treatment  for BPPV  Your healthcare provider may try to move the calcium crystals. This is done by having you move your head and neck in certain ways. This treatment is safe and often works well. You may also be told to do these movements at home. You may still have vertigo for a few weeks. Your healthcare provider will recheck your symptoms, usually in about a month. Special physical therapy may also be part of treatment. In rare cases, surgery may be needed for BPPV that does not go away.     When to call the healthcare provider  Call your healthcare provider right away if you have any of these:    Symptoms that do not go away with treatment    Symptoms that get worse    New symptoms   Date Last Reviewed: 5/1/2017 2000-2017 [employer]. [address] [place] [address], [place] [address]. All rights reserved. This information is not intended as a substitute for professional medical care. Always follow your healthcare professional's instructions.

## 2018-05-11 ENCOUNTER — HOSPITAL ENCOUNTER (EMERGENCY)
Facility: OTHER | Age: 63
Discharge: HOME | End: 2018-05-12
Attending: FAMILY MEDICINE | Admitting: FAMILY MEDICINE
Payer: COMMERCIAL

## 2018-05-11 DIAGNOSIS — R10.84 ABDOMINAL PAIN, GENERALIZED: ICD-10-CM

## 2018-05-11 LAB
ALBUMIN SERPL-MCNC: 4.1 G/DL (ref 3.5–5.7)
ALP SERPL-CCNC: 81 U/L (ref 34–104)
ALT SERPL W P-5'-P-CCNC: 13 U/L (ref 7–52)
ANION GAP SERPL CALCULATED.3IONS-SCNC: 9 MMOL/L (ref 3–14)
AST SERPL W P-5'-P-CCNC: 16 U/L (ref 13–39)
BASOPHILS # BLD AUTO: 0.1 10E9/L (ref 0–0.2)
BASOPHILS NFR BLD AUTO: 0.3 %
BILIRUB SERPL-MCNC: 0.7 MG/DL (ref 0.3–1)
BUN SERPL-MCNC: 22 MG/DL (ref 7–25)
CALCIUM SERPL-MCNC: 9.8 MG/DL (ref 8.6–10.3)
CHLORIDE SERPL-SCNC: 100 MMOL/L (ref 98–107)
CO2 SERPL-SCNC: 25 MMOL/L (ref 21–31)
CREAT SERPL-MCNC: 1.26 MG/DL (ref 0.7–1.3)
CRP SERPL-MCNC: 0 MG/L
DIFFERENTIAL METHOD BLD: ABNORMAL
EOSINOPHIL # BLD AUTO: 0 10E9/L (ref 0–0.7)
EOSINOPHIL NFR BLD AUTO: 0.1 %
ERYTHROCYTE [DISTWIDTH] IN BLOOD BY AUTOMATED COUNT: 13.6 % (ref 10–15)
GFR SERPL CREATININE-BSD FRML MDRD: 58 ML/MIN/1.7M2
GLUCOSE SERPL-MCNC: 139 MG/DL (ref 70–105)
HCT VFR BLD AUTO: 45.6 % (ref 40–53)
HGB BLD-MCNC: 15.6 G/DL (ref 13.3–17.7)
IMM GRANULOCYTES # BLD: 0.1 10E9/L (ref 0–0.4)
IMM GRANULOCYTES NFR BLD: 0.5 %
LACTATE SERPL-SCNC: 1.5 MMOL/L (ref 0.5–2.2)
LIPASE SERPL-CCNC: 3 U/L (ref 11–82)
LYMPHOCYTES # BLD AUTO: 0.8 10E9/L (ref 0.8–5.3)
LYMPHOCYTES NFR BLD AUTO: 5.1 %
MCH RBC QN AUTO: 29.2 PG (ref 26.5–33)
MCHC RBC AUTO-ENTMCNC: 34.2 G/DL (ref 31.5–36.5)
MCV RBC AUTO: 85 FL (ref 78–100)
MONOCYTES # BLD AUTO: 0.5 10E9/L (ref 0–1.3)
MONOCYTES NFR BLD AUTO: 3.1 %
NEUTROPHILS # BLD AUTO: 13.3 10E9/L (ref 1.6–8.3)
NEUTROPHILS NFR BLD AUTO: 90.9 %
PLATELET # BLD AUTO: 307 10E9/L (ref 150–450)
POTASSIUM SERPL-SCNC: 4.9 MMOL/L (ref 3.5–5.1)
PROT SERPL-MCNC: 7.4 G/DL (ref 6.4–8.9)
RBC # BLD AUTO: 5.35 10E12/L (ref 4.4–5.9)
SODIUM SERPL-SCNC: 134 MMOL/L (ref 134–144)
WBC # BLD AUTO: 14.7 10E9/L (ref 4–11)

## 2018-05-11 PROCEDURE — 96374 THER/PROPH/DIAG INJ IV PUSH: CPT | Performed by: FAMILY MEDICINE

## 2018-05-11 PROCEDURE — 86140 C-REACTIVE PROTEIN: CPT | Performed by: FAMILY MEDICINE

## 2018-05-11 PROCEDURE — 96375 TX/PRO/DX INJ NEW DRUG ADDON: CPT | Performed by: FAMILY MEDICINE

## 2018-05-11 PROCEDURE — 80053 COMPREHEN METABOLIC PANEL: CPT | Performed by: FAMILY MEDICINE

## 2018-05-11 PROCEDURE — 25000128 H RX IP 250 OP 636: Performed by: FAMILY MEDICINE

## 2018-05-11 PROCEDURE — 83690 ASSAY OF LIPASE: CPT | Performed by: FAMILY MEDICINE

## 2018-05-11 PROCEDURE — 83605 ASSAY OF LACTIC ACID: CPT | Performed by: FAMILY MEDICINE

## 2018-05-11 PROCEDURE — 36415 COLL VENOUS BLD VENIPUNCTURE: CPT | Performed by: FAMILY MEDICINE

## 2018-05-11 PROCEDURE — 85025 COMPLETE CBC W/AUTO DIFF WBC: CPT | Performed by: FAMILY MEDICINE

## 2018-05-11 PROCEDURE — 81001 URINALYSIS AUTO W/SCOPE: CPT | Performed by: FAMILY MEDICINE

## 2018-05-11 PROCEDURE — 99284 EMERGENCY DEPT VISIT MOD MDM: CPT | Mod: 25 | Performed by: FAMILY MEDICINE

## 2018-05-11 PROCEDURE — 96361 HYDRATE IV INFUSION ADD-ON: CPT | Performed by: FAMILY MEDICINE

## 2018-05-11 PROCEDURE — 99284 EMERGENCY DEPT VISIT MOD MDM: CPT | Mod: Z6 | Performed by: FAMILY MEDICINE

## 2018-05-11 RX ORDER — ONDANSETRON 2 MG/ML
4 INJECTION INTRAMUSCULAR; INTRAVENOUS ONCE
Status: COMPLETED | OUTPATIENT
Start: 2018-05-11 | End: 2018-05-11

## 2018-05-11 RX ORDER — SODIUM CHLORIDE 9 MG/ML
100 INJECTION, SOLUTION INTRAVENOUS CONTINUOUS
Status: DISCONTINUED | OUTPATIENT
Start: 2018-05-11 | End: 2018-05-12 | Stop reason: HOSPADM

## 2018-05-11 RX ORDER — FENTANYL CITRATE 50 UG/ML
50 INJECTION, SOLUTION INTRAMUSCULAR; INTRAVENOUS ONCE
Status: COMPLETED | OUTPATIENT
Start: 2018-05-11 | End: 2018-05-11

## 2018-05-11 RX ORDER — KETOROLAC TROMETHAMINE 15 MG/ML
15 INJECTION, SOLUTION INTRAMUSCULAR; INTRAVENOUS ONCE
Status: COMPLETED | OUTPATIENT
Start: 2018-05-11 | End: 2018-05-11

## 2018-05-11 RX ADMIN — FENTANYL CITRATE 50 MCG: 50 INJECTION, SOLUTION INTRAMUSCULAR; INTRAVENOUS at 21:45

## 2018-05-11 RX ADMIN — KETOROLAC TROMETHAMINE 15 MG: 15 INJECTION, SOLUTION INTRAMUSCULAR; INTRAVENOUS at 21:20

## 2018-05-11 RX ADMIN — SODIUM CHLORIDE 200 ML: 900 INJECTION, SOLUTION INTRAVENOUS at 22:50

## 2018-05-11 RX ADMIN — SODIUM CHLORIDE 1000 ML: 900 INJECTION, SOLUTION INTRAVENOUS at 21:20

## 2018-05-11 RX ADMIN — ONDANSETRON 4 MG: 2 INJECTION INTRAMUSCULAR; INTRAVENOUS at 21:26

## 2018-05-11 ASSESSMENT — ENCOUNTER SYMPTOMS
FATIGUE: 0
SORE THROAT: 0
FLATUS: 0
HEMATOCHEZIA: 0
NAUSEA: 1
DYSURIA: 0
CHILLS: 1
SHORTNESS OF BREATH: 0
FEVER: 0
BELCHING: 0
ANOREXIA: 0
ABDOMINAL PAIN: 1
DIARRHEA: 0
HEMATEMESIS: 0
CONSTIPATION: 0
COUGH: 0
HEMATURIA: 0

## 2018-05-11 NOTE — ED AVS SNAPSHOT
[facility]    [address]    [place] [address]    Phone:  [phone]    Fax:  [phone]                                       Martin Bryant   MRN: [id number]    Department:  [facility]   Date of Visit:  5/11/2018           After Visit Summary Signature Page     I have received my discharge instructions, and my questions have been answered. I have discussed any challenges I see with this plan with the nurse or doctor.    ..........................................................................................................................................  Patient/Patient Representative Signature      ..........................................................................................................................................  Patient Representative Print Name and Relationship to Patient    ..................................................               ................................................  Date                                            Time    ..........................................................................................................................................  Reviewed by Signature/Title    ...................................................              ..............................................  Date                                                            Time

## 2018-05-11 NOTE — ED AVS SNAPSHOT
"    [facility]: [phone]                                              INTERAGENCY TRANSFER FORM - LAB / IMAGING / EKG / EMG RESULTS   2018                    Hospital Admission Date: 2018  KATIE JONES   : 1955  Sex: Male        Attending Provider: Susy Rodriguez MD     Allergies:  No Known Allergies    Infection:  None   Service:  EMERGENCY ME    Ht:  1.702 m (5' 7\")   Wt:  86.2 kg (190 lb)   Admission Wt:  86.2 kg (190 lb)    BMI:  29.76 kg/m 2   BSA:  2.02 m 2            Patient PCP Information     Provider PCP Type    TAWNY RODGERS MD General         Lab Results - 3 Days      Urine Microscopic [[id number]] (Abnormal)  Resulted: 18, Result status: Final result    Ordering provider: Susy Rodriguez MD  18 Resulting lab: [facility]    Specimen Information    Type Source Collected On     18          Components       Value Reference Range Flag Lab   WBC Urine 0 - 5 OTO5^0 - 5 /HPF  [facility]   RBC Urine 2-5 OTO2^O - 2 /HPF A [facility]   Bacteria Urine Few NEG^Negative /HPF A [facility]   Mucous Urine Present NEG^Negative /LPF A [facility]            *UA reflex to Microscopic [[id number]] (Abnormal)  Resulted: 18, Result status: Final result    Ordering provider: Susy Rodriguez MD  18 Resulting lab: [facility]    Specimen Information    Type Source Collected On   Midstream Urine  18          Components       Value Reference Range Flag Lab   Color Urine Yellow   [facility]   Appearance Urine Clear   [facility]   Glucose Urine Negative NEG^Negative mg/dL  [facility]   Bilirubin Urine Negative NEG^Negative  [facility]   Ketones Urine 40 NEG^Negative mg/dL A [facility]   Specific Gravity Urine >1.030 1.003 - 1.035  [facility]   Blood Urine Small NEG^Negative A [facility]   pH Urine 5.5 5.0 - 7.0 pH  [facility] "   Protein Albumin Urine Trace NEG^Negative mg/dL A [facility]   Urobilinogen Urine 0.2 0.2 - 1.0 EU/dL  [facility]   Nitrite Urine Negative NEG^Negative  [facility]   Leukocyte Esterase Urine Negative NEG^Negative  [facility]   Source Midstream Urine   [facility]            Comprehensive metabolic panel [[id number]] (Abnormal)  Resulted: 05/11/18 2143, Result status: Final result    Ordering provider: Susy Rodriguez MD  05/11/18 2108 Resulting lab: [facility]    Specimen Information    Type Source Collected On   Blood  05/11/18 2115          Components       Value Reference Range Flag Lab   Sodium 134 134 - 144 mmol/L  [facility]   Potassium 4.9 3.5 - 5.1 mmol/L  [facility]   Chloride 100 98 - 107 mmol/L  [facility]   Carbon Dioxide 25 21 - 31 mmol/L  [facility]   Anion Gap 9 3 - 14 mmol/L  [facility]   Glucose 139 70 - 105 mg/dL H [facility]   Urea Nitrogen 22 7 - 25 mg/dL  [facility]   Creatinine 1.26 0.70 - 1.30 mg/dL  [facility]   GFR Estimate 58 >60 mL/min/1.7m2 L [facility]   GFR Estimate If Black 70 >60 mL/min/1.7m2  [facility]   Calcium 9.8 8.6 - 10.3 mg/dL  [facility]   Bilirubin Total 0.7 0.3 - 1.0 mg/dL  [facility]   Albumin 4.1 3.5 - 5.7 g/dL  [facility]   Protein Total 7.4 6.4 - 8.9 g/dL  [facility]   Alkaline Phosphatase 81 34 - 104 U/L  [facility]   ALT 13 7 - 52 U/L  [facility]   AST 16 13 - 39 U/L  [facility]            Lipase [[id number]] (Abnormal)  Resulted: 05/11/18 2143, Result status: Final result    Ordering provider: Susy Rodriguez MD  05/11/18 2109 Resulting lab: [facility]    Specimen Information    Type Source Collected On   Blood  05/11/18 2115          Components       Value Reference Range Flag Lab   Lipase 3 11 - 82 U/L L [facility]            CRP inflammation [[id number]]  Resulted: 05/11/18 2143, Result status: Final result    Ordering provider: Susy Rodriguez MD  05/11/18 2110 Resulting lab:  [facility]    Specimen Information    Type Source Collected On   Blood  05/11/18 2115          Components       Value Reference Range Flag Lab   CRP Inflammation 0.0 <0.5 mg/L  [facility]            Lactic acid [[id number]]  Resulted: 05/11/18 2138, Result status: Final result    Ordering provider: Susy Rodriguez MD  05/11/18 2110 Resulting lab: [facility]    Specimen Information    Type Source Collected On   Blood  05/11/18 2115          Components       Value Reference Range Flag Lab   Lactic Acid 1.5 0.5 - 2.2 mmol/L  [facility]            CBC with platelets differential [[id number]] (Abnormal)  Resulted: 05/11/18 2123, Result status: Final result    Ordering provider: Susy Rodriguez MD  05/11/18 2108 Resulting lab: [facility]    Specimen Information    Type Source Collected On   Blood  05/11/18 2115          Components       Value Reference Range Flag Lab   WBC 14.7 4.0 - 11.0 10e9/L H [facility]   RBC Count 5.35 4.4 - 5.9 10e12/L  [facility]   Hemoglobin 15.6 13.3 - 17.7 g/dL  [facility]   Hematocrit 45.6 40.0 - 53.0 %  [facility]   MCV 85 78 - 100 fl  [facility]   MCH 29.2 26.5 - 33.0 pg  [facility]   MCHC 34.2 31.5 - 36.5 g/dL  [facility]   RDW 13.6 10.0 - 15.0 %  [facility]   Platelet Count 307 150 - 450 10e9/L  [facility]   Diff Method Automated Method   [facility]   % Neutrophils 90.9 %  [facility]   % Lymphocytes 5.1 %  [facility]   % Monocytes 3.1 %  [facility]   % Eosinophils 0.1 %  [facility]   % Basophils 0.3 %  [facility]   % Immature Granulocytes 0.5 %  [facility]   Absolute Neutrophil 13.3 1.6 - 8.3 10e9/L H [facility]   Absolute Lymphocytes 0.8 0.8 - 5.3 10e9/L  [facility]   Absolute Monocytes 0.5 0.0 - 1.3 10e9/L  [facility]   Absolute Eosinophils 0.0 0.0 - 0.7 10e9/L  [facility]   Absolute Basophils 0.1 0.0 - 0.2 10e9/L  [facility]   Abs Immature Granulocytes 0.1 0 - 0.4 10e9/L  [facility]             Testing Performed By     Lab - Abbreviation Name Director Address Valid Date Range    1743 - [facility] Unknown [address]  [facility] [address] 08/07/15 0948 - Present            Unresulted Labs     None      Encounter-Level Documents:     There are no encounter-level documents.      Order-Level Documents:     There are no order-level documents.

## 2018-05-11 NOTE — ED AVS SNAPSHOT
[facility]    [address]    [place] [address]    Phone:  [phone]    Fax:  [phone]                                       Martin Bryant   MRN: [id number]    Department:  [facility]   Date of Visit:  5/11/2018           Patient Information     Date Of Birth          1955        Your diagnoses for this visit were:     Abdominal pain, generalized        You were seen by Susy Rodriguez MD.      Follow-up Information     Follow up with Obi Khan MD.    Specialty:  Internal Medicine    Why:  follow up with primary care doctor to make sure hematuria has resolved and creatinine normalized     Contact information:    [address]  [place] [address] [phone]          Discharge Instructions         Kidney Stone, Passed    A kidney stone (nephrolithiasis) starts as tiny crystals that form inside the kidney where urine is made. Most kidney stones enlarge to about 1/8 to 1/4 inch in size before leaving the kidney and moving toward the bladder. The sharp, cramping pain and nausea/vomiting you had was the stone moving through the ureter (the narrow tube joining the kidney to the bladder). Once the stone reaches your bladder, the pain stops. Pain may start again as the stone passes through the bladder and out through the urethra. There are 4 types of kidney stones. Eighty percent are calcium stones--mostly calcium oxalate but also some with calcium phosphate. The other 3 types include uric acid stones, struvite stones (from a preceding infection), and rarely, cystine stones.  Home care  The following guidelines will help you care for yourself at home:    Drink plenty of fluids. This increases urine flow and reduces the chance that a new stone will form. Healthy adults (no heart/liver/kidney disease) who have had a kidney stone should drink 12, 8-ounce glasses of fluids per day. Most of this should be water. The goal  is to produce 1.5 to 2 quarts of almost colorless urine per 24 hours.    Unless another NSAID (non-steroidal anti-inflammatory drug) was given, you may take ibuprofen or naproxen in addition to any narcotic pain medicine your healthcare provider prescribes. If you have chronic liver or kidney disease or ever had a stomach ulcer or GI bleeding, talk with your healthcare provider before using these medicines.    Collecting the stone. If you were given a strainer, urinate into a jar then pour the urine through the strainer and into the toilet. Keep doing this for 24 hours after your pain stops. Save any stone that you find in the strainer and bring it to your healthcare provider for analysis. If you do not collect a stone, a 24-hour urine specimen can be done at a later time by your healthcare provider to figure out the cause of your stone.  Prevention  Each year, there is a chance that a new stone will form (50% chance over the next 5 to 7 years). The risk is highest if you have a family history of kidney stones or have certain chronic illnesses such as hypertension, obesity, or diabetes. However, there are lifestyle and dietary changes that you can make to reduce the risk of getting another kidney stone.  Most kidney stones are made of calcium. The following advice can help you prevent calcium stones. If you don t know the type of stone you have, follow this advice until the healthcare provider determines the cause of your stone.  Things that help:    The most important thing you can do is to drink plenty of fluids each day, as described above.    Certain foods, such as wheat, rice, rye, barley, and beans, contain phytate. Phytate is a compound that may lower the risk of recurrence of any type of stone.    Eat more fruits and vegetables, especially those high in potassium.    Eat foods high in natural citrate like fruit and fruit juices (using low sugar).    Low calcium contributes to calcium type kidney stones. Eat  a normal calcium diet and talk with your healthcare provider if you are taking calcium supplements. It may be detrimental to lower your calcium intake. New research shows that eating calcium-rich and oxalate-rich foods together lowers your risk of stones. This is because eating these foods together binds the minerals in the stomach and intestines before they can reach the kidneys.    Limit salt intake to 2 grams (1 teaspoon) per day. Use limited amounts when cooking, and don t add salt at the table. Processed and canned foods are usually high in salt.    Spinach, rhubarb, peanuts, cashews, almonds, grapefruit, and grapefruit juice are all high oxalate foods and should be reduced, or eaten with calcium-rich foods. These foods include dairy, dark leafy greens, soy products, calcium-enriched foods, and others.    Reduce the amount of animal meat and high protein foods in your diet. This may lower your risk of uric acid stones.    Don't have excess sugar (sucrose) and fructose (sweetener in many soft drinks) in your diet.    If you take vitamin C as a supplement, don't take more than 1,000 milligrams (mg) per day.    A dietitian or your healthcare provider can give you ideas on how to change your diet to prevent more kidney stones.  Follow-up care  Follow up with your healthcare provider, or as advised. Even if you don't collect the kidney stone, it is possible to analyze a 24-hour urine collection for the cause of this stone. Discuss this with your healthcare provider.  If you had an X-ray, CT scan, or other diagnostic test, you will be told of any findings that may affect your care.  Call 911  Call 911 if you have any of these:    Weakness, dizziness, or fainting  When to seek medical advice  Call your healthcare provider right away if any of the these occur:    Severe pain that returns and not relieved by pain medicines    Repeated vomiting or unable to keep down fluids    Fever of 100.4 F (38 C) or higher, or as  directed by your healthcare provider    Blood clots in urine    Foul smelling or cloudy urine    Unable to pass urine for 8 hours or increasing bladder pressure  Date Last Reviewed: 10/1/2016    6731-5231 [employer]. [address] [place] [address], [place] [address]. All rights reserved. This information is not intended as a substitute for professional medical care. Always follow your healthcare professional's instructions.          24 Hour Appointment Hotline       To make an appointment at any [facility], call [phone] ([phone]). If you don't have a family doctor or clinic, we will help you find one. [facility] clinics are conveniently located to serve the needs of you and your family.             Review of your medicines      Our records show that you are taking the medicines listed below. If these are incorrect, please call your family doctor or clinic.        Dose / Directions Last dose taken    GAS-X PO   Dose:  160 mg        Take 160 mg by mouth   Refills:  0        glucosamine-chondroitin 500-400 MG Caps per capsule   Dose:  1 capsule        Take 1 capsule by mouth 3 times daily   Refills:  0        omeprazole 20 MG CR capsule   Commonly known as:  priLOSEC   Dose:  20 mg        Take 20 mg by mouth daily   Refills:  0                Procedures and tests performed during your visit     *UA reflex to Microscopic    CBC with platelets differential    CRP inflammation    Comprehensive metabolic panel    Lactic acid    Lipase    Urine Microscopic      Orders Needing Specimen Collection     None      Pending Results     No orders found for last 3 day(s).            Pending Culture Results     No orders found for last 3 day(s).            Pending Results Instructions     If you had any lab results that were not finalized at the time of your Discharge, you can call the ED Lab Result RN at [phone]. You will be contacted by this team for any positive Lab results or changes in treatment. The  "nurses are available 7 days a week from 10A to 6:30P.  You can leave a message 24 hours per day and they will return your call.        Thank you for choosing [facility]       Thank you for choosing [facility] for your care. Our goal is always to provide you with excellent care. Hearing back from our patients is one way we can continue to improve our services. Please take a few minutes to complete the written survey that you may receive in the mail after you visit with us. Thank you!        [employer] Information     [employer] lets you send messages to your doctor, view your test results, renew your prescriptions, schedule appointments and more. To sign up, go to [email or web address] . Click on \"Log in\" on the left side of the screen, which will take you to the Welcome page. Then click on \"Sign up Now\" on the right side of the page.     You will be asked to enter the access code listed below, as well as some personal information. Please follow the directions to create your username and password.     Your access code is: [email or web address]  Expires: 2018  9:11 AM     Your access code will  in 90 days. If you need help or a new code, please call your [facility] clinic or [phone].        Care EveryWhere ID     This is your Care EveryWhere ID. This could be used by other organizations to access your [facility] medical records  [id number]        Equal Access to Services     DEYANIRA BAKER : Hadjasiel Valladares, waaxda dave, qaybta rajatalracquel espinoza . So [facility] [phone].    ATENCIÓN: Si habla español, tiene a garcia disposición servicios gratuitos de asistencia lingüística. Ivan al [phone].    We comply with applicable federal civil rights laws and [place] laws. We do not discriminate on the basis of race, color, national origin, age, disability, sex, sexual orientation, or gender identity.            After Visit Summary       This is your record. Keep this " with you and show to your community pharmacist(s) and doctor(s) at your next visit.

## 2018-05-12 VITALS
OXYGEN SATURATION: 97 % | DIASTOLIC BLOOD PRESSURE: 90 MMHG | TEMPERATURE: 98.2 F | HEIGHT: 67 IN | HEART RATE: 56 BPM | SYSTOLIC BLOOD PRESSURE: 125 MMHG | RESPIRATION RATE: 14 BRPM | WEIGHT: 190 LBS | BODY MASS INDEX: 29.82 KG/M2

## 2018-05-12 LAB
ALBUMIN UR-MCNC: ABNORMAL MG/DL
APPEARANCE UR: CLEAR
BACTERIA #/AREA URNS HPF: ABNORMAL /HPF
BILIRUB UR QL STRIP: NEGATIVE
COLOR UR AUTO: YELLOW
GLUCOSE UR STRIP-MCNC: NEGATIVE MG/DL
HGB UR QL STRIP: ABNORMAL
KETONES UR STRIP-MCNC: 40 MG/DL
LEUKOCYTE ESTERASE UR QL STRIP: NEGATIVE
MUCOUS THREADS #/AREA URNS LPF: PRESENT /LPF
NITRATE UR QL: NEGATIVE
PH UR STRIP: 5.5 PH (ref 5–7)
RBC #/AREA URNS AUTO: ABNORMAL /HPF
SOURCE: ABNORMAL
SP GR UR STRIP: >1.03 (ref 1–1.03)
UROBILINOGEN UR STRIP-ACNC: 0.2 EU/DL (ref 0.2–1)
WBC #/AREA URNS AUTO: ABNORMAL /HPF

## 2018-05-12 NOTE — ED TRIAGE NOTES
ED Nursing Triage Note (General)   ________________________________    Martin Bryant is a 63 year old Male that presents to triage private car  With history of having pain in lower abdomen both sides about 1400 today and vomited and then vomited again 30 minutes later.  This afternoon had urge to void, with only little amounts voided at a time.  Pain he reports feels like gas pain, unable pass gas since this afternoon.  He does reports having a normal bowel  Movement this morning.  Pain is better when he is standing up. =  Patient appears alert  and oriented, in obvious discomfort., and cooperative and agitated behavior.  GCS Total = 15  Airway: intact  Breathing noted as shallow and rapid due to pain.  Lung sounds clear throughout.  Circulation Normal with   Skin normal, warm, dry  Action taken:  Settled onto cart in bay 9, changed into gown.      PRE HOSPITAL PRIOR LIVING SITUATION Spouse

## 2018-05-12 NOTE — ED PROVIDER NOTES
History   No chief complaint on file.    Patient is a 63 year old male presenting with abdominal pain.   Abdominal Pain   Pain location:  LLQ and RLQ  Pain quality: cramping    Pain radiates to:  Does not radiate  Pain severity:  Severe  Onset quality:  Sudden  Duration:  7 hours  Timing:  Constant  Progression:  Worsening  Chronicity:  New  Context: previous surgery and retching    Context: not alcohol use, not awakening from sleep, not diet changes, not eating, not laxative use, not medication withdrawal, not recent illness, not recent sexual activity, not recent travel, not sick contacts, not suspicious food intake and not trauma    Relieved by:  Vomiting and movement  Exacerbated by: laying down or sitting   Associated symptoms: chills and nausea    Associated symptoms: no anorexia, no belching, no chest pain, no constipation, no cough, no diarrhea, no dysuria, no fatigue, no fever, no flatus, no hematemesis, no hematochezia, no hematuria, no melena, no shortness of breath, no sore throat, no vaginal bleeding and no vaginal discharge    Risk factors: being elderly    Risk factors: no alcohol abuse, no aspirin use, has not had multiple surgeries, no NSAID use, not obese, not pregnant and no recent hospitalization      Martin Bryant is a 63 year old male who presents with acute onset of abdominal pain     Problem List:    Patient Active Problem List    Diagnosis Date Noted     Psychosexual dysfunction with inhibited sexual excitement 02/23/2018     Priority: Medium     Rosacea 02/23/2018     Priority: Medium     Tobacco abuse 02/23/2018     Priority: Medium     Closed fracture of shaft of right tibia with routine healing 12/29/2017     Priority: Medium     Pain of right tibia 12/13/2017     Priority: Medium     Gastroesophageal reflux disease without esophagitis 07/25/2017     Priority: Medium     Osteoarthrosis 07/25/2017     Priority: Medium     Left rotator cuff tear 06/19/2015     Priority: Medium      "Tear of right rotator cuff 06/19/2015     Priority: Medium        Past Medical History:    Past Medical History:   Diagnosis Date     Epigastric pain      Nicotine dependence, uncomplicated      Other sexual dysfunction not due to a substance or known physiological condition      Rosacea        Past Surgical History:    Past Surgical History:   Procedure Laterality Date     OTHER SURGICAL HISTORY      ,HERNIA REPAIR,Bilateral     OTHER SURGICAL HISTORY      2012,[id number],PAROTIDECTOMY,Left,Benign       Family History:    Family History   Problem Relation Age of Onset     Prostate Cancer Father      Cancer-prostate     CANCER Sister      Cancer,Bone       Social History:  Marital Status:   [2]  Social History   Substance Use Topics     Smoking status: Current Every Day Smoker     Packs/day: 0.25     Years: 40.00     Types: Cigarettes     Smokeless tobacco: Never Used     Alcohol use No      Comment: Alcoholic Drinks/day: rare use.        Medications:      glucosamine-chondroitin 500-400 MG CAPS per capsule   omeprazole (PRILOSEC) 20 MG CR capsule   Simethicone (GAS-X PO)         Review of Systems   Constitutional: Positive for chills. Negative for fatigue and fever.   HENT: Negative for sore throat.    Respiratory: Negative for cough and shortness of breath.    Cardiovascular: Negative for chest pain.   Gastrointestinal: Positive for abdominal pain and nausea. Negative for anorexia, constipation, diarrhea, flatus, hematemesis, hematochezia and melena.   Genitourinary: Negative for dysuria, hematuria, vaginal bleeding and vaginal discharge.       Physical Exam   BP: 157/88  Pulse: 56  Temp: 96  F (35.6  C)  Resp: 28  Height: 170.2 cm (5' 7\")  Weight: 86.2 kg (190 lb)  SpO2: 99 %      Physical Exam   Constitutional: He is oriented to person, place, and time. He appears well-developed and well-nourished. No distress.   HENT:   Head: Normocephalic and atraumatic.   Right Ear: External ear normal.   Left Ear: " External ear normal.   Eyes: Pupils are equal, round, and reactive to light.   Neck: Normal range of motion. Neck supple. No JVD present. No tracheal deviation present. No thyromegaly present.   Cardiovascular: Normal rate and regular rhythm.    Pulmonary/Chest: Effort normal and breath sounds normal. No stridor.   Abdominal: Soft. Bowel sounds are normal.   Abdominal exam limited as patient unable to lay in bed . Standing and pacing around the hospital bed.    Musculoskeletal: Normal range of motion.   Lymphadenopathy:     He has no cervical adenopathy.   Neurological: He is alert and oriented to person, place, and time.   Skin: Skin is warm and dry. He is not diaphoretic.   Nursing note and vitals reviewed.      ED Course     ED Course     Procedures          Patient arrived to ER with his wife for concern of several hour history of abdominal pain .  Patient triaged to exam room. Exam limited secondary to severe abdominal discomfort and inability to lay still  On exam table. Vital signs reviewed. Labs and diagnostics ordered . Patient with  8/10 pain . IV fluid bolus, zofran, toradol ordered. Patient with persistent severe discomfort. IV fentanyl ordered while await results of further lab results . After fentanyl patient with complete resolution of abdominal discomfort without recurrence. Labs significant for elevated white count but normal lactate and crp so likely acute phase reaction .Creatinine slightly elevated from last value 1 month ago.  Urinalysis with small blood and ketones. Discusssed suspicion for passed kidney stone with patient. Given he has no fever and has had complete resolution of symptoms without recurrence patient will be discharged to home. If patient develops recurrent symptoms he will return to ER at which time will proceed with CT abdomen/pelvis for further evaluation . Patient is recommended to follow up with his primary care physician to assure resolution of hematuria and normalization  of his creatinine. Encourage drink plenty of fluids after discharge. Patient provided with written and verbal instructions regarding passed kidney stones and recommended follow up      Results for orders placed or performed during the hospital encounter of 05/11/18   CBC with platelets differential   Result Value Ref Range    WBC 14.7 (H) 4.0 - 11.0 10e9/L    RBC Count 5.35 4.4 - 5.9 10e12/L    Hemoglobin 15.6 13.3 - 17.7 g/dL    Hematocrit 45.6 40.0 - 53.0 %    MCV 85 78 - 100 fl    MCH 29.2 26.5 - 33.0 pg    MCHC 34.2 31.5 - 36.5 g/dL    RDW 13.6 10.0 - 15.0 %    Platelet Count 307 150 - 450 10e9/L    Diff Method Automated Method     % Neutrophils 90.9 %    % Lymphocytes 5.1 %    % Monocytes 3.1 %    % Eosinophils 0.1 %    % Basophils 0.3 %    % Immature Granulocytes 0.5 %    Absolute Neutrophil 13.3 (H) 1.6 - 8.3 10e9/L    Absolute Lymphocytes 0.8 0.8 - 5.3 10e9/L    Absolute Monocytes 0.5 0.0 - 1.3 10e9/L    Absolute Eosinophils 0.0 0.0 - 0.7 10e9/L    Absolute Basophils 0.1 0.0 - 0.2 10e9/L    Abs Immature Granulocytes 0.1 0 - 0.4 10e9/L   Comprehensive metabolic panel   Result Value Ref Range    Sodium 134 134 - 144 mmol/L    Potassium 4.9 3.5 - 5.1 mmol/L    Chloride 100 98 - 107 mmol/L    Carbon Dioxide 25 21 - 31 mmol/L    Anion Gap 9 3 - 14 mmol/L    Glucose 139 (H) 70 - 105 mg/dL    Urea Nitrogen 22 7 - 25 mg/dL    Creatinine 1.26 0.70 - 1.30 mg/dL    GFR Estimate 58 (L) >60 mL/min/1.7m2    GFR Estimate If Black 70 >60 mL/min/1.7m2    Calcium 9.8 8.6 - 10.3 mg/dL    Bilirubin Total 0.7 0.3 - 1.0 mg/dL    Albumin 4.1 3.5 - 5.7 g/dL    Protein Total 7.4 6.4 - 8.9 g/dL    Alkaline Phosphatase 81 34 - 104 U/L    ALT 13 7 - 52 U/L    AST 16 13 - 39 U/L   Lipase   Result Value Ref Range    Lipase 3 (L) 11 - 82 U/L   Lactic acid   Result Value Ref Range    Lactic Acid 1.5 0.5 - 2.2 mmol/L   CRP inflammation   Result Value Ref Range    CRP Inflammation 0.0 <0.5 mg/L   *UA reflex to Microscopic   Result Value Ref  Range    Color Urine Yellow     Appearance Urine Clear     Glucose Urine Negative NEG^Negative mg/dL    Bilirubin Urine Negative NEG^Negative    Ketones Urine 40 (A) NEG^Negative mg/dL    Specific Gravity Urine >1.030 1.003 - 1.035    Blood Urine Small (A) NEG^Negative    pH Urine 5.5 5.0 - 7.0 pH    Protein Albumin Urine Trace (A) NEG^Negative mg/dL    Urobilinogen Urine 0.2 0.2 - 1.0 EU/dL    Nitrite Urine Negative NEG^Negative    Leukocyte Esterase Urine Negative NEG^Negative    Source Midstream Urine    Urine Microscopic   Result Value Ref Range    WBC Urine 0 - 5 OTO5^0 - 5 /HPF    RBC Urine 2-5 (A) OTO2^O - 2 /HPF    Bacteria Urine Few (A) NEG^Negative /HPF    Mucous Urine Present (A) NEG^Negative /LPF             No results found for this or any previous visit (from the past 24 hour(s)).    Medications - No data to display    Assessments & Plan (with Medical Decision Making)     I have reviewed the nursing notes.    I have reviewed the findings, diagnosis, plan and need for follow up with the patient.       New Prescriptions    No medications on file       Final diagnoses:   None   Renal stone ; passed     5/11/2018   [facility]     Susy Rodriugez MD  05/12/18 0031       Susy Rodriguez MD  08/26/18 1938

## 2018-05-12 NOTE — DISCHARGE INSTRUCTIONS
Kidney Stone, Passed    A kidney stone (nephrolithiasis) starts as tiny crystals that form inside the kidney where urine is made. Most kidney stones enlarge to about 1/8 to 1/4 inch in size before leaving the kidney and moving toward the bladder. The sharp, cramping pain and nausea/vomiting you had was the stone moving through the ureter (the narrow tube joining the kidney to the bladder). Once the stone reaches your bladder, the pain stops. Pain may start again as the stone passes through the bladder and out through the urethra. There are 4 types of kidney stones. Eighty percent are calcium stones--mostly calcium oxalate but also some with calcium phosphate. The other 3 types include uric acid stones, struvite stones (from a preceding infection), and rarely, cystine stones.  Home care  The following guidelines will help you care for yourself at home:    Drink plenty of fluids. This increases urine flow and reduces the chance that a new stone will form. Healthy adults (no heart/liver/kidney disease) who have had a kidney stone should drink 12, 8-ounce glasses of fluids per day. Most of this should be water. The goal is to produce 1.5 to 2 quarts of almost colorless urine per 24 hours.    Unless another NSAID (non-steroidal anti-inflammatory drug) was given, you may take ibuprofen or naproxen in addition to any narcotic pain medicine your healthcare provider prescribes. If you have chronic liver or kidney disease or ever had a stomach ulcer or GI bleeding, talk with your healthcare provider before using these medicines.    Collecting the stone. If you were given a strainer, urinate into a jar then pour the urine through the strainer and into the toilet. Keep doing this for 24 hours after your pain stops. Save any stone that you find in the strainer and bring it to your healthcare provider for analysis. If you do not collect a stone, a 24-hour urine specimen can be done at a later time by your healthcare provider to  figure out the cause of your stone.  Prevention  Each year, there is a chance that a new stone will form (50% chance over the next 5 to 7 years). The risk is highest if you have a family history of kidney stones or have certain chronic illnesses such as hypertension, obesity, or diabetes. However, there are lifestyle and dietary changes that you can make to reduce the risk of getting another kidney stone.  Most kidney stones are made of calcium. The following advice can help you prevent calcium stones. If you don t know the type of stone you have, follow this advice until the healthcare provider determines the cause of your stone.  Things that help:    The most important thing you can do is to drink plenty of fluids each day, as described above.    Certain foods, such as wheat, rice, rye, barley, and beans, contain phytate. Phytate is a compound that may lower the risk of recurrence of any type of stone.    Eat more fruits and vegetables, especially those high in potassium.    Eat foods high in natural citrate like fruit and fruit juices (using low sugar).    Low calcium contributes to calcium type kidney stones. Eat a normal calcium diet and talk with your healthcare provider if you are taking calcium supplements. It may be detrimental to lower your calcium intake. New research shows that eating calcium-rich and oxalate-rich foods together lowers your risk of stones. This is because eating these foods together binds the minerals in the stomach and intestines before they can reach the kidneys.    Limit salt intake to 2 grams (1 teaspoon) per day. Use limited amounts when cooking, and don t add salt at the table. Processed and canned foods are usually high in salt.    Spinach, rhubarb, peanuts, cashews, almonds, grapefruit, and grapefruit juice are all high oxalate foods and should be reduced, or eaten with calcium-rich foods. These foods include dairy, dark leafy greens, soy products, calcium-enriched foods, and  others.    Reduce the amount of animal meat and high protein foods in your diet. This may lower your risk of uric acid stones.    Don't have excess sugar (sucrose) and fructose (sweetener in many soft drinks) in your diet.    If you take vitamin C as a supplement, don't take more than 1,000 milligrams (mg) per day.    A dietitian or your healthcare provider can give you ideas on how to change your diet to prevent more kidney stones.  Follow-up care  Follow up with your healthcare provider, or as advised. Even if you don't collect the kidney stone, it is possible to analyze a 24-hour urine collection for the cause of this stone. Discuss this with your healthcare provider.  If you had an X-ray, CT scan, or other diagnostic test, you will be told of any findings that may affect your care.  Call 911  Call 911 if you have any of these:    Weakness, dizziness, or fainting  When to seek medical advice  Call your healthcare provider right away if any of the these occur:    Severe pain that returns and not relieved by pain medicines    Repeated vomiting or unable to keep down fluids    Fever of 100.4 F (38 C) or higher, or as directed by your healthcare provider    Blood clots in urine    Foul smelling or cloudy urine    Unable to pass urine for 8 hours or increasing bladder pressure  Date Last Reviewed: 10/1/2016    9938-4884 [employer]. [address] [place] [address], [place] [address]. All rights reserved. This information is not intended as a substitute for professional medical care. Always follow your healthcare professional's instructions.

## 2018-06-04 ENCOUNTER — OFFICE VISIT (OUTPATIENT)
Dept: INTERNAL MEDICINE | Facility: OTHER | Age: 63
End: 2018-06-04
Attending: INTERNAL MEDICINE
Payer: COMMERCIAL

## 2018-06-04 VITALS
SYSTOLIC BLOOD PRESSURE: 124 MMHG | WEIGHT: 186 LBS | HEART RATE: 84 BPM | BODY MASS INDEX: 29.13 KG/M2 | DIASTOLIC BLOOD PRESSURE: 86 MMHG

## 2018-06-04 DIAGNOSIS — N20.0 NEPHROLITHIASIS: Primary | ICD-10-CM

## 2018-06-04 DIAGNOSIS — Z00.00 HEALTHCARE MAINTENANCE: Primary | ICD-10-CM

## 2018-06-04 DIAGNOSIS — Z12.11 SPECIAL SCREENING FOR MALIGNANT NEOPLASMS, COLON: ICD-10-CM

## 2018-06-04 LAB
ALBUMIN UR-MCNC: NEGATIVE MG/DL
APPEARANCE UR: CLEAR
BILIRUB UR QL STRIP: NEGATIVE
COLOR UR AUTO: YELLOW
GLUCOSE UR STRIP-MCNC: NEGATIVE MG/DL
HGB UR QL STRIP: ABNORMAL
KETONES UR STRIP-MCNC: NEGATIVE MG/DL
LEUKOCYTE ESTERASE UR QL STRIP: ABNORMAL
NITRATE UR QL: NEGATIVE
PH UR STRIP: 5 PH (ref 5–7)
RBC #/AREA URNS AUTO: NORMAL /HPF
SOURCE: ABNORMAL
SP GR UR STRIP: 1.02 (ref 1–1.03)
UROBILINOGEN UR STRIP-ACNC: 0.2 EU/DL (ref 0.2–1)
WBC #/AREA URNS AUTO: NORMAL /HPF

## 2018-06-04 PROCEDURE — 99214 OFFICE O/P EST MOD 30 MIN: CPT | Performed by: INTERNAL MEDICINE

## 2018-06-04 PROCEDURE — 81001 URINALYSIS AUTO W/SCOPE: CPT | Performed by: INTERNAL MEDICINE

## 2018-06-04 RX ORDER — SODIUM PHOSPHATE,MONO-DIBASIC 19G-7G/118
1 ENEMA (ML) RECTAL 2 TIMES DAILY
COMMUNITY
Start: 2018-06-04 | End: 2019-09-10

## 2018-06-04 ASSESSMENT — ENCOUNTER SYMPTOMS
ENDOCRINE NEGATIVE: 1
HEMATOLOGIC/LYMPHATIC NEGATIVE: 1
ALLERGIC/IMMUNOLOGIC NEGATIVE: 1
CONSTITUTIONAL NEGATIVE: 1

## 2018-06-04 NOTE — TELEPHONE ENCOUNTER
Screening Questions for the Scheduling of Screening Colonoscopies   (If Colonoscopy is diagnostic, Provider should review the chart before scheduling.)  Are you younger than 50 or older than 80?   NO   Do you take aspirin or fish oil?    NO   (if yes, tell patient to stop 1 week prior to Colonoscopy)  Do you take warfarin (Coumadin), clopidogrel (Plavix), apixaban (Eliquis), dabigatram (Pradaxa), rivaroxaban (Xarelto) or any blood thinner?   NO   Do you use oxygen at home?    NO   Do you have kidney disease?   NO   Are you on dialysis?   NO  Have you had a stroke or heart attack in the last year?   NO   Have you had a stent in your heart or any blood vessel in the last year?   NO   Have you had a transplant of any organ?  NO  Have you had a colonoscopy or upper endoscopy (EGD) before?   NO          When?    Date of scheduled Colonoscopy.   07/02/2018  Provider  [facility]   [facility]arm[facility]

## 2018-06-04 NOTE — MR AVS SNAPSHOT
"              After Visit Summary   2018    Martin Bryant    MRN: [id number]           Patient Information     Date Of Birth          1955        Visit Information        Provider Department      2018 3:40 PM Obi Khan MD [facility]        Today's Diagnoses     Nephrolithiasis    -  1    Special screening for malignant neoplasms, colon           Follow-ups after your visit        Additional Services     GASTROENTEROLOGY ADULT REF PROCEDURE ONLY Other ([facility])                 Who to contact     If you have questions or need follow up information about today's clinic visit or your schedule please contact [facility] directly at [phone].  Normal or non-critical lab and imaging results will be communicated to you by [employer], letter or phone within 4 business days after the clinic has received the results. If you do not hear from us within 7 days, please contact the clinic through MyChart or phone. If you have a critical or abnormal lab result, we will notify you by phone as soon as possible.  Submit refill requests through [employer] or call your pharmacy and they will forward the refill request to us. Please allow 3 business days for your refill to be completed.          Additional Information About Your Visit        MyChart Information     WEbook lets you send messages to your doctor, view your test results, renew your prescriptions, schedule appointments and more. To sign up, go to [email or web address] . Click on \"Log in\" on the left side of the screen, which will take you to the Welcome page. Then click on \"Sign up Now\" on the right side of the page.     You will be asked to enter the access code listed below, as well as some personal information. Please follow the directions to create your username and password.     Your access code is: [email or web address]  Expires: 2018  9:11 AM     Your access code will  in 90 days. If you need help or a new " code, please call your [facility] clinic or [phone].        Care EveryWhere ID     This is your Care EveryWhere ID. This could be used by other organizations to access your [facility] medical records  [id number]        Your Vitals Were     Pulse BMI (Body Mass Index)                84 29.13 kg/m2           Blood Pressure from Last 3 Encounters:   06/04/18 124/86   05/12/18 125/90   04/23/18 138/82    Weight from Last 3 Encounters:   06/04/18 186 lb (84.4 kg)   05/11/18 190 lb (86.2 kg)   04/23/18 189 lb 8 oz (86 kg)              We Performed the Following     *UA reflex to Microscopic     GASTROENTEROLOGY ADULT REF PROCEDURE ONLY Other ([facility])     Urine Microscopic          Today's Medication Changes          These changes are accurate as of 6/4/18  4:41 PM.  If you have any questions, ask your nurse or doctor.               These medicines have changed or have updated prescriptions.        Dose/Directions    glucosamine-chondroitin 500-400 MG Caps per capsule   This may have changed:  when to take this   Changed by:  Obi Khan MD        Dose:  1 capsule   Take 1 capsule by mouth 2 times daily   Refills:  0         Stop taking these medicines if you haven't already. Please contact your care team if you have questions.     GAS-X PO   Stopped by:  Obi Khan MD                    Primary Care Provider Office Phone # Fax #    Obi Khan [phone] [address] [facility] [address]        Equal Access to Services     [facility] AH: Hadii alex Valladares, waaxda luqadaha, qaybta kaalmada adeegyada, wax olimpia hernandes [facility] naqvi. So [facility] [phone].    ATENCIÓN: Si habla español, tiene a garcia disposición servicios gratuitos de asistencia lingüística. Llame al [phone].    We comply with applicable federal civil rights laws and [place] laws. We do not discriminate on the basis of race, color, national origin, age, disability, sex, sexual  orientation, or gender identity.            Thank you!     Thank you for choosing [facility] AND [facility]  for your care. Our goal is always to provide you with excellent care. Hearing back from our patients is one way we can continue to improve our services. Please take a few minutes to complete the written survey that you may receive in the mail after your visit with us. Thank you!             Your Updated Medication List - Protect others around you: Learn how to safely use, store and throw away your medicines at www.disposemymeds.org.          This list is accurate as of 6/4/18  4:41 PM.  Always use your most recent med list.                   Brand Name Dispense Instructions for use Diagnosis    glucosamine-chondroitin 500-400 MG Caps per capsule      Take 1 capsule by mouth 2 times daily        omeprazole 20 MG CR capsule    priLOSEC     Take 20 mg by mouth daily

## 2018-06-04 NOTE — PROGRESS NOTES
Chief Complaint: Emergency room visit follow-up.    HPI: This patient is here today for follow-up from the ER.  He had fairly significant flank pain that caused him to go to the emergency room.  This was associated with some blood in his urine.  The pain abruptly resolved after he was given some pain medications.  He did not have any further testing done and he was instructed to follow-up with me.  He has not had any pain since.  He has not had a history of kidney stones in the past.    Unfortunately he does continue to smoke.  This of course for increases his risks for  cancers that could be the cause for his pain or his hematuria.  In addition, he has never had a colonoscopy.    Medications are reconciled.  Past medical history, past surgical history, family history and social histories are reviewed and updated.    Past Medical History:   Diagnosis Date     Epigastric pain     No Comments Provided     Nicotine dependence, uncomplicated     No Comments Provided     Rosacea     No Comments Provided       Past Surgical History:   Procedure Laterality Date     HERNIA REPAIR Bilateral      PAROTIDECTOMY Left 2012       No Known Allergies    Current Outpatient Prescriptions   Medication Sig Dispense Refill     glucosamine-chondroitin 500-400 MG CAPS per capsule Take 1 capsule by mouth 2 times daily       omeprazole (PRILOSEC) 20 MG CR capsule Take 20 mg by mouth daily         Review of Systems   Constitutional: Negative.    Endocrine: Negative.    Skin: Negative.    Allergic/Immunologic: Negative.    Hematological: Negative.        Physical Exam   Constitutional: He appears well-developed and well-nourished. No distress.   Skin: He is not diaphoretic.   Nursing note and vitals reviewed.      Assessment:        ICD-10-CM    1. Nephrolithiasis N20.0 *UA reflex to Microscopic     Urine Microscopic   2. Special screening for malignant neoplasms, colon Z12.11 GASTROENTEROLOGY ADULT REF PROCEDURE ONLY Other ([facility])        Plan: Discussion with the patient.  Urine is pending, if he still has hematuria he will need to be seen by urology.  If his hematuria has cleared, we will just continue with watchful waiting unless he has further episodes at which time I do think that CT urography would be appropriate.  Strongly encouraged him to discontinue smoking.  I scheduled him for colonoscopy as well because of his low abdominal pain.  He has not had one and he is 63.

## 2018-06-04 NOTE — LETTER
June 4, 2018      Martin Bryant  [address] [facility] [address]        Dear Martin Bryant,    Below are the results of your recent labs:    Results for orders placed or performed in visit on 06/04/18   *UA reflex to Microscopic   Result Value Ref Range    Color Urine Yellow     Appearance Urine Clear     Glucose Urine Negative NEG^Negative mg/dL    Bilirubin Urine Negative NEG^Negative    Ketones Urine Negative NEG^Negative mg/dL    Specific Gravity Urine 1.025 1.003 - 1.035    Blood Urine Trace (A) NEG^Negative    pH Urine 5.0 5.0 - 7.0 pH    Protein Albumin Urine Negative NEG^Negative mg/dL    Urobilinogen Urine 0.2 0.2 - 1.0 EU/dL    Nitrite Urine Negative NEG^Negative    Leukocyte Esterase Urine Trace (A) NEG^Negative    Source Midstream Urine    Urine Microscopic   Result Value Ref Range    WBC Urine 0 - 5 OTO5^0 - 5 /HPF    RBC Urine O - 2 OTO2^O - 2 /HPF        Your urine testing is fine.  Congratulations on this report.  Assuming that you do not have any further problems with pain possibly related to kidney stones, I do not think that any additional treatment or investigation is necessary.  Nonetheless, I do want you to follow through with the colonoscopy.  If you do get further pain or if you have any blood in your urine, be sure that you return for reevaluation.    Sincerely,        Obi Khan MD  Internal Medicine  [facility]

## 2018-06-04 NOTE — NURSING NOTE
The patient is here today to be seen for a follow up visit.  Moira Loredo LPN on 6/4/2018 at 3:38 PM

## 2018-06-05 RX ORDER — BISACODYL 5 MG/1
TABLET, DELAYED RELEASE ORAL
Qty: 2 TABLET | Refills: 0 | Status: SHIPPED | OUTPATIENT
Start: 2018-06-05 | End: 2019-08-23

## 2018-06-05 RX ORDER — POLYETHYLENE GLYCOL 3350, SODIUM CHLORIDE, SODIUM BICARBONATE, POTASSIUM CHLORIDE 420; 11.2; 5.72; 1.48 G/4L; G/4L; G/4L; G/4L
4000 POWDER, FOR SOLUTION ORAL ONCE
Qty: 4000 ML | Refills: 0 | Status: SHIPPED | OUTPATIENT
Start: 2018-06-05 | End: 2018-06-05

## 2018-06-05 ASSESSMENT — PATIENT HEALTH QUESTIONNAIRE - PHQ9: SUM OF ALL RESPONSES TO PHQ QUESTIONS 1-9: 0

## 2018-07-23 NOTE — PROGRESS NOTES
Patient Information     Patient Name  Martin Bryant MRN  [id number] Sex  Male   1955      Letter by Obi Khan MD at      Author:  Obi Khan MD Service:  (none) Author Type:  (none)    Filed:   Encounter Date:  2017 Status:  (Other)           Martin Bryant  [address] [facility] [address]          2017    Dear Martin,    Following are the tests completed during your last clinic visit:    Results for orders placed or performed in visit on 17      COMPLETE METABOLIC PANEL      Result  Value Ref Range    SODIUM 137 133 - 143 mmol/L    POTASSIUM 4.2 3.5 - 5.1 mmol/L    CHLORIDE 103 98 - 107 mmol/L    CO2,TOTAL 24 21 - 31 mmol/L    ANION GAP 10 5 - 18                    GLUCOSE 98 70 - 105 mg/dL    CALCIUM 9.7 8.6 - 10.3 mg/dL    BUN 16 7 - 25 mg/dL    CREATININE 0.91 0.70 - 1.30 mg/dL    BUN/CREAT RATIO           18                    GFR if African American >60 >60 ml/min/1.73m2    GFR if not African American >60 >60 ml/min/1.73m2    ALBUMIN 4.1 3.5 - 5.7 g/dL    PROTEIN,TOTAL 7.2 6.4 - 8.9 g/dL    GLOBULIN                  3.1 2.0 - 3.7 g/dL    A/G RATIO 1.3 1.0 - 2.0                    BILIRUBIN,TOTAL 0.8 0.3 - 1.0 mg/dL    ALK PHOSPHATASE 79 34 - 104 IU/L    ALT (SGPT) 27 7 - 52 IU/L    AST (SGOT) 16 13 - 39 IU/L   LIPID PANEL      Result  Value Ref Range    CHOLESTEROL,TOTAL 199 <200 mg/dL    TRIGLYCERIDES 194 (H) <150 mg/dL    HDL CHOLESTEROL 28 23 - 92 mg/dL    NON-HDL CHOLESTEROL 171 (H) <145 mg/dl    CHOL/HDL RATIO            7.11 (H) <4.50                    LDL CHOLESTEROL 132 (H) <100 mg/dL    PATIENT STATUS            FASTING                   ANTI HCV      Result  Value Ref Range    HEPATITIS C ANTIBODY Non-Reactive Non-Reactive   PSA, TOTAL      Result  Value Ref Range    PSA TOTAL (DIAGNOSTIC) 1.291 <=3.100 ng/mL         Your blood tests show that your cholesterol level is at the upper limits of normal. Everything else is normal. Work on a healthy  and low cholesterol diet in order to keep your values as low as possible. If you have any questions about your results, feel free to contact me.    Sincerely,      Obi Khan MD  Internal Medicine  [facility]

## 2019-08-23 ENCOUNTER — ANESTHESIA (OUTPATIENT)
Dept: SURGERY | Facility: OTHER | Age: 64
DRG: 352 | End: 2019-08-23
Payer: COMMERCIAL

## 2019-08-23 ENCOUNTER — OFFICE VISIT (OUTPATIENT)
Dept: FAMILY MEDICINE | Facility: OTHER | Age: 64
End: 2019-08-23
Attending: NURSE PRACTITIONER
Payer: COMMERCIAL

## 2019-08-23 ENCOUNTER — ANESTHESIA EVENT (OUTPATIENT)
Dept: SURGERY | Facility: OTHER | Age: 64
DRG: 352 | End: 2019-08-23
Payer: COMMERCIAL

## 2019-08-23 ENCOUNTER — APPOINTMENT (OUTPATIENT)
Dept: ULTRASOUND IMAGING | Facility: OTHER | Age: 64
DRG: 352 | End: 2019-08-23
Attending: PHYSICIAN ASSISTANT
Payer: COMMERCIAL

## 2019-08-23 ENCOUNTER — HOSPITAL ENCOUNTER (INPATIENT)
Facility: OTHER | Age: 64
LOS: 1 days | Discharge: HOME | DRG: 352 | End: 2019-08-24
Attending: PHYSICIAN ASSISTANT | Admitting: SURGERY
Payer: COMMERCIAL

## 2019-08-23 ENCOUNTER — APPOINTMENT (OUTPATIENT)
Dept: GENERAL RADIOLOGY | Facility: OTHER | Age: 64
DRG: 352 | End: 2019-08-23
Attending: PHYSICIAN ASSISTANT
Payer: COMMERCIAL

## 2019-08-23 VITALS
DIASTOLIC BLOOD PRESSURE: 84 MMHG | HEIGHT: 67 IN | OXYGEN SATURATION: 94 % | BODY MASS INDEX: 27.48 KG/M2 | RESPIRATION RATE: 16 BRPM | HEART RATE: 79 BPM | TEMPERATURE: 98.4 F | SYSTOLIC BLOOD PRESSURE: 126 MMHG | WEIGHT: 175.1 LBS

## 2019-08-23 DIAGNOSIS — F17.210 CIGARETTE SMOKER: ICD-10-CM

## 2019-08-23 DIAGNOSIS — K40.30: ICD-10-CM

## 2019-08-23 DIAGNOSIS — K40.30 STRANGULATED INGUINAL HERNIA: Primary | ICD-10-CM

## 2019-08-23 DIAGNOSIS — R10.84 ABDOMINAL PAIN, GENERALIZED: Primary | ICD-10-CM

## 2019-08-23 PROCEDURE — 37000009 ZZH ANESTHESIA TECHNICAL FEE, EACH ADDTL 15 MIN: Performed by: SURGERY

## 2019-08-23 PROCEDURE — 99285 EMERGENCY DEPT VISIT HI MDM: CPT | Mod: Z6 | Performed by: PHYSICIAN ASSISTANT

## 2019-08-23 PROCEDURE — 25000125 ZZHC RX 250: Performed by: SURGERY

## 2019-08-23 PROCEDURE — 37000008 ZZH ANESTHESIA TECHNICAL FEE, 1ST 30 MIN: Performed by: SURGERY

## 2019-08-23 PROCEDURE — 40000275 ZZH STATISTIC RCP TIME EA 10 MIN

## 2019-08-23 PROCEDURE — 49507 PRP I/HERN INIT BLOCK >5 YR: CPT | Performed by: SURGERY

## 2019-08-23 PROCEDURE — 12000000 ZZH R&B MED SURG/OB

## 2019-08-23 PROCEDURE — 71000014 ZZH RECOVERY PHASE 1 LEVEL 2 FIRST HR: Performed by: SURGERY

## 2019-08-23 PROCEDURE — 0YU60JZ SUPPLEMENT LEFT INGUINAL REGION WITH SYNTHETIC SUBSTITUTE, OPEN APPROACH: ICD-10-PCS | Performed by: SURGERY

## 2019-08-23 PROCEDURE — 36000050 ZZH SURGERY LEVEL 2 1ST 30 MIN: Performed by: SURGERY

## 2019-08-23 PROCEDURE — 25000128 H RX IP 250 OP 636: Performed by: SURGERY

## 2019-08-23 PROCEDURE — 27210794 ZZH OR GENERAL SUPPLY STERILE: Performed by: SURGERY

## 2019-08-23 PROCEDURE — 25000128 H RX IP 250 OP 636: Performed by: NURSE ANESTHETIST, CERTIFIED REGISTERED

## 2019-08-23 PROCEDURE — 25000125 ZZHC RX 250: Performed by: NURSE ANESTHETIST, CERTIFIED REGISTERED

## 2019-08-23 PROCEDURE — 25000132 ZZH RX MED GY IP 250 OP 250 PS 637: Performed by: SURGERY

## 2019-08-23 PROCEDURE — 76705 ECHO EXAM OF ABDOMEN: CPT

## 2019-08-23 PROCEDURE — 74019 RADEX ABDOMEN 2 VIEWS: CPT

## 2019-08-23 PROCEDURE — 49507 PRP I/HERN INIT BLOCK >5 YR: CPT | Performed by: NURSE ANESTHETIST, CERTIFIED REGISTERED

## 2019-08-23 PROCEDURE — 99207 ZZC NO CHARGE LOS: CPT | Performed by: NURSE PRACTITIONER

## 2019-08-23 PROCEDURE — 25800030 ZZH RX IP 258 OP 636: Performed by: SURGERY

## 2019-08-23 PROCEDURE — 94640 AIRWAY INHALATION TREATMENT: CPT

## 2019-08-23 PROCEDURE — 99285 EMERGENCY DEPT VISIT HI MDM: CPT | Mod: 25 | Performed by: PHYSICIAN ASSISTANT

## 2019-08-23 PROCEDURE — 36000052 ZZH SURGERY LEVEL 2 EA 15 ADDTL MIN: Performed by: SURGERY

## 2019-08-23 PROCEDURE — 40000306 ZZH STATISTIC PRE PROC ASSESS II: Performed by: SURGERY

## 2019-08-23 PROCEDURE — C1781 MESH (IMPLANTABLE): HCPCS | Performed by: SURGERY

## 2019-08-23 PROCEDURE — 25000566 ZZH SEVOFLURANE, EA 15 MIN: Performed by: SURGERY

## 2019-08-23 DEVICE — MESH PROGRIP 4.7X3" PARIETEX RIGHT TEM1208GR: Type: IMPLANTABLE DEVICE | Site: INGUINAL | Status: FUNCTIONAL

## 2019-08-23 RX ORDER — ACETAMINOPHEN 10 MG/ML
1000 INJECTION, SOLUTION INTRAVENOUS ONCE
Status: COMPLETED | OUTPATIENT
Start: 2019-08-23 | End: 2019-08-23

## 2019-08-23 RX ORDER — SODIUM CHLORIDE, SODIUM LACTATE, POTASSIUM CHLORIDE, CALCIUM CHLORIDE 600; 310; 30; 20 MG/100ML; MG/100ML; MG/100ML; MG/100ML
INJECTION, SOLUTION INTRAVENOUS CONTINUOUS
Status: DISCONTINUED | OUTPATIENT
Start: 2019-08-23 | End: 2019-08-23 | Stop reason: HOSPADM

## 2019-08-23 RX ORDER — ACETAMINOPHEN 325 MG/1
975 TABLET ORAL EVERY 8 HOURS
Status: DISCONTINUED | OUTPATIENT
Start: 2019-08-24 | End: 2019-08-24 | Stop reason: HOSPADM

## 2019-08-23 RX ORDER — NALOXONE HYDROCHLORIDE 0.4 MG/ML
.1-.4 INJECTION, SOLUTION INTRAMUSCULAR; INTRAVENOUS; SUBCUTANEOUS
Status: DISCONTINUED | OUTPATIENT
Start: 2019-08-23 | End: 2019-08-23

## 2019-08-23 RX ORDER — ONDANSETRON 2 MG/ML
4 INJECTION INTRAMUSCULAR; INTRAVENOUS EVERY 30 MIN PRN
Status: DISCONTINUED | OUTPATIENT
Start: 2019-08-23 | End: 2019-08-23 | Stop reason: HOSPADM

## 2019-08-23 RX ORDER — ACETAMINOPHEN 325 MG/1
650 TABLET ORAL EVERY 4 HOURS PRN
Status: DISCONTINUED | OUTPATIENT
Start: 2019-08-26 | End: 2019-08-24 | Stop reason: HOSPADM

## 2019-08-23 RX ORDER — PROPOFOL 10 MG/ML
INJECTION, EMULSION INTRAVENOUS PRN
Status: DISCONTINUED | OUTPATIENT
Start: 2019-08-23 | End: 2019-08-23

## 2019-08-23 RX ORDER — DEXAMETHASONE SODIUM PHOSPHATE 4 MG/ML
INJECTION, SOLUTION INTRA-ARTICULAR; INTRALESIONAL; INTRAMUSCULAR; INTRAVENOUS; SOFT TISSUE PRN
Status: DISCONTINUED | OUTPATIENT
Start: 2019-08-23 | End: 2019-08-23

## 2019-08-23 RX ORDER — METOCLOPRAMIDE 10 MG/1
10 TABLET ORAL EVERY 6 HOURS PRN
Status: DISCONTINUED | OUTPATIENT
Start: 2019-08-23 | End: 2019-08-24 | Stop reason: HOSPADM

## 2019-08-23 RX ORDER — FENTANYL CITRATE 50 UG/ML
INJECTION, SOLUTION INTRAMUSCULAR; INTRAVENOUS PRN
Status: DISCONTINUED | OUTPATIENT
Start: 2019-08-23 | End: 2019-08-23

## 2019-08-23 RX ORDER — NALOXONE HYDROCHLORIDE 0.4 MG/ML
.1-.4 INJECTION, SOLUTION INTRAMUSCULAR; INTRAVENOUS; SUBCUTANEOUS
Status: DISCONTINUED | OUTPATIENT
Start: 2019-08-23 | End: 2019-08-24 | Stop reason: HOSPADM

## 2019-08-23 RX ORDER — KETAMINE HYDROCHLORIDE 10 MG/ML
INJECTION INTRAMUSCULAR; INTRAVENOUS PRN
Status: DISCONTINUED | OUTPATIENT
Start: 2019-08-23 | End: 2019-08-23

## 2019-08-23 RX ORDER — PROCHLORPERAZINE MALEATE 10 MG
10 TABLET ORAL EVERY 6 HOURS PRN
Status: DISCONTINUED | OUTPATIENT
Start: 2019-08-23 | End: 2019-08-24 | Stop reason: HOSPADM

## 2019-08-23 RX ORDER — LIDOCAINE 40 MG/G
CREAM TOPICAL
Status: DISCONTINUED | OUTPATIENT
Start: 2019-08-23 | End: 2019-08-23 | Stop reason: HOSPADM

## 2019-08-23 RX ORDER — BUPIVACAINE HYDROCHLORIDE AND EPINEPHRINE 5; 5 MG/ML; UG/ML
INJECTION, SOLUTION EPIDURAL; INTRACAUDAL; PERINEURAL PRN
Status: DISCONTINUED | OUTPATIENT
Start: 2019-08-23 | End: 2019-08-23 | Stop reason: HOSPADM

## 2019-08-23 RX ORDER — LIDOCAINE 40 MG/G
CREAM TOPICAL
Status: DISCONTINUED | OUTPATIENT
Start: 2019-08-23 | End: 2019-08-24 | Stop reason: HOSPADM

## 2019-08-23 RX ORDER — DIPHENHYDRAMINE HYDROCHLORIDE 50 MG/ML
25 INJECTION INTRAMUSCULAR; INTRAVENOUS EVERY 6 HOURS PRN
Status: DISCONTINUED | OUTPATIENT
Start: 2019-08-23 | End: 2019-08-24 | Stop reason: HOSPADM

## 2019-08-23 RX ORDER — DIPHENHYDRAMINE HCL 25 MG
25 CAPSULE ORAL EVERY 6 HOURS PRN
Status: DISCONTINUED | OUTPATIENT
Start: 2019-08-23 | End: 2019-08-24 | Stop reason: HOSPADM

## 2019-08-23 RX ORDER — AMOXICILLIN 250 MG
2 CAPSULE ORAL 2 TIMES DAILY
Status: DISCONTINUED | OUTPATIENT
Start: 2019-08-23 | End: 2019-08-24 | Stop reason: HOSPADM

## 2019-08-23 RX ORDER — ONDANSETRON 4 MG/1
4 TABLET, ORALLY DISINTEGRATING ORAL EVERY 6 HOURS PRN
Status: DISCONTINUED | OUTPATIENT
Start: 2019-08-23 | End: 2019-08-24 | Stop reason: HOSPADM

## 2019-08-23 RX ORDER — OXYCODONE HYDROCHLORIDE 5 MG/1
5-10 TABLET ORAL
Status: DISCONTINUED | OUTPATIENT
Start: 2019-08-23 | End: 2019-08-24 | Stop reason: HOSPADM

## 2019-08-23 RX ORDER — ONDANSETRON 2 MG/ML
4 INJECTION INTRAMUSCULAR; INTRAVENOUS EVERY 6 HOURS PRN
Status: DISCONTINUED | OUTPATIENT
Start: 2019-08-23 | End: 2019-08-24 | Stop reason: HOSPADM

## 2019-08-23 RX ORDER — FENTANYL CITRATE 50 UG/ML
25-50 INJECTION, SOLUTION INTRAMUSCULAR; INTRAVENOUS
Status: DISCONTINUED | OUTPATIENT
Start: 2019-08-23 | End: 2019-08-23 | Stop reason: HOSPADM

## 2019-08-23 RX ORDER — AMOXICILLIN 250 MG
1 CAPSULE ORAL 2 TIMES DAILY
Status: DISCONTINUED | OUTPATIENT
Start: 2019-08-23 | End: 2019-08-24 | Stop reason: HOSPADM

## 2019-08-23 RX ORDER — METOCLOPRAMIDE HYDROCHLORIDE 5 MG/ML
INJECTION INTRAMUSCULAR; INTRAVENOUS PRN
Status: DISCONTINUED | OUTPATIENT
Start: 2019-08-23 | End: 2019-08-23

## 2019-08-23 RX ORDER — ONDANSETRON 4 MG/1
4 TABLET, ORALLY DISINTEGRATING ORAL EVERY 30 MIN PRN
Status: DISCONTINUED | OUTPATIENT
Start: 2019-08-23 | End: 2019-08-23 | Stop reason: HOSPADM

## 2019-08-23 RX ORDER — CEFAZOLIN SODIUM 2 G/100ML
2 INJECTION, SOLUTION INTRAVENOUS
Status: COMPLETED | OUTPATIENT
Start: 2019-08-23 | End: 2019-08-23

## 2019-08-23 RX ORDER — SODIUM CHLORIDE, SODIUM LACTATE, POTASSIUM CHLORIDE, CALCIUM CHLORIDE 600; 310; 30; 20 MG/100ML; MG/100ML; MG/100ML; MG/100ML
INJECTION, SOLUTION INTRAVENOUS CONTINUOUS
Status: DISCONTINUED | OUTPATIENT
Start: 2019-08-23 | End: 2019-08-24 | Stop reason: HOSPADM

## 2019-08-23 RX ORDER — METOCLOPRAMIDE HYDROCHLORIDE 5 MG/ML
10 INJECTION INTRAMUSCULAR; INTRAVENOUS EVERY 6 HOURS PRN
Status: DISCONTINUED | OUTPATIENT
Start: 2019-08-23 | End: 2019-08-24 | Stop reason: HOSPADM

## 2019-08-23 RX ORDER — PANTOPRAZOLE SODIUM 40 MG/1
40 TABLET, DELAYED RELEASE ORAL
Status: DISCONTINUED | OUTPATIENT
Start: 2019-08-24 | End: 2019-08-24 | Stop reason: HOSPADM

## 2019-08-23 RX ORDER — IPRATROPIUM BROMIDE AND ALBUTEROL SULFATE 2.5; .5 MG/3ML; MG/3ML
3 SOLUTION RESPIRATORY (INHALATION) ONCE
Status: COMPLETED | OUTPATIENT
Start: 2019-08-23 | End: 2019-08-23

## 2019-08-23 RX ORDER — CEFAZOLIN SODIUM 1 G/50ML
1 INJECTION, SOLUTION INTRAVENOUS SEE ADMIN INSTRUCTIONS
Status: DISCONTINUED | OUTPATIENT
Start: 2019-08-23 | End: 2019-08-23 | Stop reason: HOSPADM

## 2019-08-23 RX ORDER — ACETAMINOPHEN 325 MG/1
975 TABLET ORAL ONCE
Status: COMPLETED | OUTPATIENT
Start: 2019-08-23 | End: 2019-08-23

## 2019-08-23 RX ORDER — KETOROLAC TROMETHAMINE 30 MG/ML
30 INJECTION, SOLUTION INTRAMUSCULAR; INTRAVENOUS ONCE
Status: COMPLETED | OUTPATIENT
Start: 2019-08-23 | End: 2019-08-23

## 2019-08-23 RX ORDER — ONDANSETRON 2 MG/ML
INJECTION INTRAMUSCULAR; INTRAVENOUS PRN
Status: DISCONTINUED | OUTPATIENT
Start: 2019-08-23 | End: 2019-08-23

## 2019-08-23 RX ORDER — LIDOCAINE HYDROCHLORIDE 20 MG/ML
INJECTION, SOLUTION INFILTRATION; PERINEURAL PRN
Status: DISCONTINUED | OUTPATIENT
Start: 2019-08-23 | End: 2019-08-23

## 2019-08-23 RX ORDER — PROPOFOL 10 MG/ML
INJECTION, EMULSION INTRAVENOUS CONTINUOUS PRN
Status: DISCONTINUED | OUTPATIENT
Start: 2019-08-23 | End: 2019-08-23

## 2019-08-23 RX ADMIN — CEFAZOLIN SODIUM 2 G: 2 INJECTION, SOLUTION INTRAVENOUS at 14:05

## 2019-08-23 RX ADMIN — SODIUM CHLORIDE, POTASSIUM CHLORIDE, SODIUM LACTATE AND CALCIUM CHLORIDE: 600; 310; 30; 20 INJECTION, SOLUTION INTRAVENOUS at 13:43

## 2019-08-23 RX ADMIN — METOCLOPRAMIDE HYDROCHLORIDE 10 MG: 5 INJECTION INTRAMUSCULAR; INTRAVENOUS at 14:09

## 2019-08-23 RX ADMIN — FENTANYL CITRATE 50 MCG: 50 INJECTION, SOLUTION INTRAMUSCULAR; INTRAVENOUS at 14:08

## 2019-08-23 RX ADMIN — SODIUM CHLORIDE, POTASSIUM CHLORIDE, SODIUM LACTATE AND CALCIUM CHLORIDE: 600; 310; 30; 20 INJECTION, SOLUTION INTRAVENOUS at 15:07

## 2019-08-23 RX ADMIN — PROPOFOL 180 MG: 10 INJECTION, EMULSION INTRAVENOUS at 14:08

## 2019-08-23 RX ADMIN — SODIUM CHLORIDE, POTASSIUM CHLORIDE, SODIUM LACTATE AND CALCIUM CHLORIDE: 600; 310; 30; 20 INJECTION, SOLUTION INTRAVENOUS at 21:20

## 2019-08-23 RX ADMIN — SENNOSIDES AND DOCUSATE SODIUM 2 TABLET: 8.6; 5 TABLET ORAL at 20:03

## 2019-08-23 RX ADMIN — LIDOCAINE HYDROCHLORIDE 40 MG: 20 INJECTION, SOLUTION INFILTRATION; PERINEURAL at 14:08

## 2019-08-23 RX ADMIN — IPRATROPIUM BROMIDE AND ALBUTEROL SULFATE 3 ML: .5; 3 SOLUTION RESPIRATORY (INHALATION) at 13:53

## 2019-08-23 RX ADMIN — DEXAMETHASONE SODIUM PHOSPHATE 4 MG: 4 INJECTION, SOLUTION INTRA-ARTICULAR; INTRALESIONAL; INTRAMUSCULAR; INTRAVENOUS; SOFT TISSUE at 14:08

## 2019-08-23 RX ADMIN — MIDAZOLAM 2 MG: 1 INJECTION INTRAMUSCULAR; INTRAVENOUS at 14:08

## 2019-08-23 RX ADMIN — PROPOFOL 100 MCG/KG/MIN: 10 INJECTION, EMULSION INTRAVENOUS at 14:08

## 2019-08-23 RX ADMIN — KETOROLAC TROMETHAMINE 30 MG: 30 INJECTION, SOLUTION INTRAMUSCULAR at 13:44

## 2019-08-23 RX ADMIN — ACETAMINOPHEN 975 MG: 325 TABLET, FILM COATED ORAL at 13:42

## 2019-08-23 RX ADMIN — FENTANYL CITRATE 50 MCG: 50 INJECTION, SOLUTION INTRAMUSCULAR; INTRAVENOUS at 15:34

## 2019-08-23 RX ADMIN — ACETAMINOPHEN 1000 MG: 10 INJECTION, SOLUTION INTRAVENOUS at 17:19

## 2019-08-23 RX ADMIN — FENTANYL CITRATE 50 MCG: 50 INJECTION, SOLUTION INTRAMUSCULAR; INTRAVENOUS at 15:04

## 2019-08-23 RX ADMIN — FENTANYL CITRATE 50 MCG: 50 INJECTION, SOLUTION INTRAMUSCULAR; INTRAVENOUS at 14:47

## 2019-08-23 RX ADMIN — ONDANSETRON 4 MG: 2 INJECTION INTRAMUSCULAR; INTRAVENOUS at 14:08

## 2019-08-23 RX ADMIN — Medication 30 MG: at 14:08

## 2019-08-23 ASSESSMENT — ENCOUNTER SYMPTOMS
BLOOD IN STOOL: 0
CHEST TIGHTNESS: 0
APPETITE CHANGE: 1
HEMATURIA: 0
VOMITING: 0
ADENOPATHY: 0
BRUISES/BLEEDS EASILY: 0
SORE THROAT: 0
CONFUSION: 0
DIARRHEA: 0
NECK PAIN: 0
CHILLS: 0
CONSTIPATION: 1
WOUND: 0
FEVER: 0
SHORTNESS OF BREATH: 0
TROUBLE SWALLOWING: 0
BACK PAIN: 0
ABDOMINAL PAIN: 1
NAUSEA: 0
FACIAL SWELLING: 0

## 2019-08-23 ASSESSMENT — ACTIVITIES OF DAILY LIVING (ADL)
TRANSFERRING: 0-->INDEPENDENT
SWALLOWING: 0-->SWALLOWS FOODS/LIQUIDS WITHOUT DIFFICULTY
RETIRED_EATING: 0-->INDEPENDENT
COGNITION: 0 - NO COGNITION ISSUES REPORTED
DRESS: 0-->INDEPENDENT
ADLS_ACUITY_SCORE: 11
BATHING: 0-->INDEPENDENT
RETIRED_COMMUNICATION: 0-->UNDERSTANDS/COMMUNICATES WITHOUT DIFFICULTY
TOILETING: 0-->INDEPENDENT
AMBULATION: 0-->INDEPENDENT
FALL_HISTORY_WITHIN_LAST_SIX_MONTHS: NO

## 2019-08-23 ASSESSMENT — MIFFLIN-ST. JEOR
SCORE: 1534.94
SCORE: 1576.62
SCORE: 1534.94

## 2019-08-23 ASSESSMENT — LIFESTYLE VARIABLES: TOBACCO_USE: 1

## 2019-08-23 ASSESSMENT — PAIN SCALES - GENERAL: PAINLEVEL: MODERATE PAIN (5)

## 2019-08-23 NOTE — ED TRIAGE NOTES
Pt arrives to the ED via W/C from [facility] with abdominal pain and thinks it could be from his hernia.  Pt has had abdominal pain for the past 2 days.  Pt states it feels like he needs to have a BM but cannot.  Pt's hernia is by his left groin region.  Pt states that he vomited last night.  Pt rates his pain a 5/10 but can get worse intermittently.

## 2019-08-23 NOTE — CONSULTS
GENERAL SURGERY CONSULTATION NOTE    Martin Bryant   [address] [facility] [address]  64 year old  male    Primary Care Provider:  Obi Khan      HPI: Martin Bryant presents to clinic with left groin pain and bulge.  Patient has been having increased swelling and pain for the last 2 days.  Patient had a bowel movement last 2 days.  Patient is felt nauseous and threw up last night.  Patient denies abdominal distention and nausea now.  Patient remembers been passing gas.  Patient denies fevers chills.  Patient has a history of bilateral inguinal hernia repair with mesh and notes that he got recurrent bulge very shortly after the left side was repaired.  He did not have much for symptoms on the left side except for the bulge.  He says the bulge feels a lot more firm today than it has in the past.    REVIEW OF SYSTEMS:    GENERAL: No fevers or chills. Denies fatigue, recent weight loss.  HEENT: No sinus drainage. No changes with vision or hearing. No difficulty swallowing.   LYMPHATICS:  No swollen nodes in axilla, neck or groin.  CARDIOVASCULAR: Denies chest pain, palpitations and dyspnea on exertion.  PULMONARY: No shortness of breath or cough. No increase in sputum production.  GI: Denies melena,bright red blood in stools. No hematemesis. No constipation or diarrhea.  : No dysuria or hematuria.  SKIN: No recent rashes or ulcers.   HEMATOLOGY:  No history of easy bruising or bleeding.  ENDOCRINE:  No history of diabetes or thyroid problems.  NEUROLOGY:  No history of seizures or headaches. No motor or sensory changes.        Patient Active Problem List   Diagnosis     Psychosexual dysfunction with inhibited sexual excitement     Gastroesophageal reflux disease without esophagitis     Osteoarthrosis     Rosacea     Tobacco abuse     Nephrolithiasis       Past Medical History:   Diagnosis Date     Epigastric pain     No Comments Provided     Nicotine dependence, uncomplicated     No Comments  Provided     Rosacea     No Comments Provided       Past Surgical History:   Procedure Laterality Date     HERNIA REPAIR Bilateral      PAROTIDECTOMY Left 2012       Family History   Problem Relation Age of Onset     Prostate Cancer Father      Cancer Sister         Bone       Social History     Social History Narrative     and works at [email or web address].  Grown children and 2 grandchildren.  Tobacco use 1 ppd - ongoing.       Social History     Socioeconomic History     Marital status:      Spouse name: Not on file     Number of children: Not on file     Years of education: Not on file     Highest education level: Not on file   Occupational History     Not on file   Social Needs     Financial resource strain: Not on file     Food insecurity:     Worry: Not on file     Inability: Not on file     Transportation needs:     Medical: Not on file     Non-medical: Not on file   Tobacco Use     Smoking status: Current Every Day Smoker     Packs/day: 0.25     Years: 40.00     Pack years: 10.00     Types: Cigarettes     Smokeless tobacco: Never Used   Substance and Sexual Activity     Alcohol use: No     Comment: Alcoholic Drinks/day: rare use.     Drug use: No     Types: Other     Comment: Drug use: No     Sexual activity: Not on file   Lifestyle     Physical activity:     Days per week: Not on file     Minutes per session: Not on file     Stress: Not on file   Relationships     Social connections:     Talks on phone: Not on file     Gets together: Not on file     Attends Zoroastrian service: Not on file     Active member of club or organization: Not on file     Attends meetings of clubs or organizations: Not on file     Relationship status: Not on file     Intimate partner violence:     Fear of current or ex partner: Not on file     Emotionally abused: Not on file     Physically abused: Not on file     Forced sexual activity: Not on file   Other Topics Concern     Parent/sibling w/ CABG, MI or angioplasty before 65F 55M?  "Not Asked   Social History Narrative     and works at [employer].  Grown children and 2 grandchildren.  Tobacco use 1 ppd - ongoing.         No current facility-administered medications on file prior to encounter.   Current Outpatient Medications on File Prior to Encounter:  omeprazole (PRILOSEC) 20 MG CR capsule Take 20 mg by mouth daily   glucosamine-chondroitin 500-400 MG CAPS per capsule Take 1 capsule by mouth 2 times daily         ALLERGIES/SENSITIVITIES: No Known Allergies    PHYSICAL EXAM:     /84   Temp 98.4  F (36.9  C) (Tympanic)   Resp 16   Ht 1.689 m (5' 6.5\")   Wt 79.4 kg (175 lb 1.6 oz)   SpO2 94%   BMI 27.84 kg/m      General Appearance:   Sitting up in the chair, no apparent distress  HEENT: Pupils are equal and reactive, no scleral icterus,   Heart & CV:  RRR no murmur.  Intact distal pulses, good cap refill.  LUNGS: No increased work of breathing.Lugns are CTA B/L, no wheezing or crackles.  Abd: Soft, nontender, nondistended.  Swollen, tender, non-reducible left inguinal hernia.  No evidence of right inguinal hernia or umbilical  Ext: Normal bulk and tone, no lower extremity edema  Neuro: Alert and oriented, normal speech and mentation    Upper abdominal x-ray shows nonspecific bowel gas pattern    Left groin ultrasound shows probable left inguinal hernia.        CONSULTATION ASSESSMENT AND PLAN:    64 year old male with strangulate did left inguinal hernia.  The hernia likely contains bowel and there is possibility patient would need a bowel resection in addition to the left inguinal hernia repair.  Patient is hemodynamically stable and nontoxic at present, indicating unlikely ischemic bowel.  The technical details of left inguinal hernia repair with mesh were discussed with the patient along with the risks and benefits to include bleeding, infection, recurrence, injury to surrounding structures including the spermatic cord leading to loss of testicle, and bowel that may be " contained within the hernia.  The patient demonstrated understanding and is willing to proceed.    To the operating room emergently for strain related left inguinal hernia.          Roderick Jackson MD on 8/23/2019 at 1:14 PM

## 2019-08-23 NOTE — ANESTHESIA PREPROCEDURE EVALUATION
Anesthesia Pre-Procedure Evaluation    Patient: Martin Bryant   MRN: [id number] : 1955          Preoperative Diagnosis: strangulated left inguinal hernia    Procedure(s):  Left HERNIa repair , INGUINAL, with Mesh    Past Medical History:   Diagnosis Date     Epigastric pain     No Comments Provided     Nicotine dependence, uncomplicated     No Comments Provided     Rosacea     No Comments Provided     Past Surgical History:   Procedure Laterality Date     HERNIA REPAIR Bilateral      PAROTIDECTOMY Left 2012       Anesthesia Evaluation     . Pt has had prior anesthetic.            ROS/MED HX    ENT/Pulmonary:     (+)tobacco use, Current use 4 -5 cigs per day, chews packs/day  , . Other pulmonary disease (had some expiratory wheeze, will give duoneb preop) .    Neurologic:  - neg neurologic ROS     Cardiovascular:  - neg cardiovascular ROS       METS/Exercise Tolerance:  >4 METS   Hematologic:  - neg hematologic  ROS       Musculoskeletal:  - neg musculoskeletal ROS       GI/Hepatic: Comment: Took prilosec this am    (+) GERD Asymptomatic on medication,       Renal/Genitourinary:     (+) Nephrolithiasis ,       Endo:  - neg endo ROS       Psychiatric:  - neg psychiatric ROS       Infectious Disease:  - neg infectious disease ROS       Malignancy:      - no malignancy   Other:    - neg other ROS                      Physical Exam  Normal systems: dental    Airway   Mallampati: I  TM distance: >3 FB  Neck ROM: full  Comment: Prominent teeth, beard    Dental     Cardiovascular   Rhythm and rate: regular and normal      Pulmonary (+) wheezes               Lab Results   Component Value Date    WBC 14.7 (H) 2018    HGB 15.6 2018    HCT 45.6 2018     2018    CRP 0.0 2018     2018    POTASSIUM 4.9 2018    CHLORIDE 100 2018    CO2 25 2018    BUN 22 2018    CR 1.26 2018     (H) 2018    SUREKHA 9.8 2018    ALBUMIN 4.1  "05/11/2018    PROTTOTAL 7.4 05/11/2018    ALT 13 05/11/2018    AST 16 05/11/2018    ALKPHOS 81 05/11/2018    BILITOTAL 0.7 05/11/2018    LIPASE 3 (L) 05/11/2018       Preop Vitals  BP Readings from Last 3 Encounters:   08/23/19 126/84   08/23/19 126/84   06/04/18 124/86    Pulse Readings from Last 3 Encounters:   08/23/19 79   06/04/18 84   05/11/18 56      Resp Readings from Last 3 Encounters:   08/23/19 16   08/23/19 16   05/12/18 14    SpO2 Readings from Last 3 Encounters:   08/23/19 94%   08/23/19 94%   05/12/18 97%      Temp Readings from Last 1 Encounters:   08/23/19 98.4  F (36.9  C) (Tympanic)    Ht Readings from Last 1 Encounters:   08/23/19 1.689 m (5' 6.5\")      Wt Readings from Last 1 Encounters:   08/23/19 79.4 kg (175 lb 1.6 oz)    Estimated body mass index is 27.84 kg/m  as calculated from the following:    Height as of this encounter: 1.689 m (5' 6.5\").    Weight as of this encounter: 79.4 kg (175 lb 1.6 oz).       Anesthesia Plan      History & Physical Review      ASA Status:  2 .    NPO Status:  > 4 hours    Plan for General and LMA with Propofol induction. Maintenance will be Balanced.    PONV prophylaxis:  Ondansetron (or other 5HT-3) and Dexamethasone or Solumedrol       Postoperative Care  Postoperative pain management:  IV analgesics and Multi-modal analgesia.      Consents  Anesthetic plan, risks, benefits and alternatives discussed with:  Spouse and Patient..                 EDGARD MENDOZA CRNA  "

## 2019-08-23 NOTE — PROGRESS NOTES
Admission Note    Data:  Martin Bryant admitted to 332 from surgery at 1630.      Action:  Dr. Jackson has been notified of admission. Pt oriented to unit, call light in reach.     Response:  Patient verbalizes understanding of plan of care, reason for admission, orientation to room and routine.    Anais Fisher RN on 8/23/2019 at 4:51 PM

## 2019-08-23 NOTE — OP NOTE
PREOPERATIVE  DIAGNOSIS: left inguinal hernia, recurrent, strangulated      POSTOPERATIVE DIAGNOSIS: left inguinal hernia, recurrent, strangulated     PROCEDURE PERFORMED:  left inguinal hernia repair with mesh     SURGEON:  Roderick Jackson MD     ASSISTANT: Circulator: David Neves RN  Relief Circulator: Kandy Ansari RN  Scrub Person: Arely Loya; Tristen Parker  First Assistant: Catalina Wynne RN  Pre-Op Nurse: Kandy So RN    ANESTHESIA: GeneralCRNA Independent: Analisa William APRN CRNA    FAMILYPHYSICIAN: Obi Khan      INDICATION FOR THE PROCEDURE:  The patient is a 64 year old y.o. male with a recurrent, strangulated left inguinal hernia.  The patient was explained risks and benefits of the procedure including but no limited to bleeding, possible infection, possible hernia recurrence or chronic pain or nerve entrapmentsyndromes.  Possible damage to the bowel or bladder or surrounding tissues. Possible loss of sensation in the groin and surrounding area, the patient demonstrated understanding and is willing to proceed.        PROCEDURE:  Martin Bryant was brought to the operating room placed in supine position.  General anesthetic was applied and LMA was placed. Patient was prepped and draped in usual sterile fashion.  Timeout was performed and everyone was in agreement to proceed.  External landmarks were marked and the incision site was determined.  Local anesthetic was infiltrated at the site of the incision to anesthetize the skin and subcutaneous tissues.  An 8 cm incision was made over the area where presumed will be the external inguinal ring.  Dissection was carried down through the skin and subcutaneous tissues.  Leslee's fascia was encountered and this was also divided.    After getting past Leslee's fascia the hernia sac was immediately visible.  There is clearly bowel within the hernia sac.  I was able to bluntly dissect with my fingers around most  of the hernia sac.  Down to the inguinal canal.  Superiorly fat and subcutaneous tissues were cleared off to reveal the external oblique muscle.  The muscle was split with electrocautery and the inguinal canal was completely open.   The 2 flaps of the external oblique muscle were grasped with hemostats and the contents of the inguinal canal were carefully brushed of the walls of the inguinal canal.  The spermatic cord and its contents were bluntly dissected free from the floor of the inguinal canal and completely encircled with Penrose drain.    The hernia sac was dissected down to the internal inguinal ring with great difficulty due to dense scar tissues.  Hernia sac that contains bowel did not easily reduce back into the patient's abdomen.  The internal inguinal ring is to be widened with electrocautery in order to reduce the hernia sac.  I opened the internal inguinal ring 1 cm superiorly and inferiorly in order to reduce the giant hernia sac.  The floor of the inguinal canal appeared intact and old mesh was visible.  The recurrence happened in an indirect fashion.  A 4.7 x 3 cm pro- mesh was placed in the inguinal floor and canal extending from the pubic tubercle up to 3 cm proximal to the internal inguinal ring.  This mesh was carefully laid flat within the inguinal canal.  The mesh completely encircled the spermatic cord and did leave a opening wide enough for the spermatic cord and the tip of my finger.  The mesh laid down flat within the space and felt relatively fixed in place however I did place 1-0 Vicryl reinforcing suture at each corner of the mesh.  The inguinal canal was irrigated with normal saline.  There was no evidence of ongoing bleeding.  External oblique muscle was reapproximated with a running 3-0 Vicryl suture.  Leslee's fascia was reapproximated with interrupted 3-0 Vicryl sutures.  Skin was closed with a running 4-0 Monocryl suture.  The skin was cleansed and dried and skin glue was  applied to the incision.  At the end of the case all sponge and needle counts were correct.  The patient tolerated procedure well and was taken to the recovery room in good condition.        EVA Jackson MD

## 2019-08-23 NOTE — ANESTHESIA CARE TRANSFER NOTE
Patient: Martin Bryant    Procedure(s):  Left HERNIa repair , INGUINAL, with Mesh    Diagnosis: strangulated left inguinal hernia  Diagnosis Additional Information: No value filed.    Anesthesia Type:   General, LMA     Note:  Airway :Room Air  Patient transferred to:PACU  Handoff Report: Identifed the Patient, Identified the Reponsible Provider, Reviewed the pertinent medical history, Discussed the surgical course, Reviewed Intra-OP anesthesia mangement and issues during anesthesia, Set expectations for post-procedure period and Allowed opportunity for questions and acknowledgement of understanding      Vitals: (Last set prior to Anesthesia Care Transfer)    CRNA VITALS  8/23/2019 1525 - 8/23/2019 1556      8/23/2019             Resp Rate (set):  10                Electronically Signed By: EDGARD MENDOZA CRNA  August 23, 2019  3:56 PM

## 2019-08-23 NOTE — ANESTHESIA POSTPROCEDURE EVALUATION
Patient: Martin Bryant    Procedure(s):  Left HERNIa repair , INGUINAL, with Mesh    Diagnosis:strangulated left inguinal hernia  Diagnosis Additional Information: No value filed.    Anesthesia Type:  General, LMA    Note:  Anesthesia Post Evaluation    Patient location during evaluation: PACU  Patient participation: Able to fully participate in evaluation  Level of consciousness: awake and alert  Pain management: adequate  Airway patency: patent  Cardiovascular status: acceptable  Respiratory status: acceptable  Hydration status: acceptable  PONV: none             Last vitals:  Vitals:    08/23/19 1600 08/23/19 1605 08/23/19 1610   BP: 114/77 123/77 118/71   Pulse: 88 80 83   Resp: 20 19    Temp:  98.3  F (36.8  C)    SpO2: 94% 95% 93%         Electronically Signed By: EDGARD MENDOZA CRNA  August 23, 2019  4:14 PM

## 2019-08-23 NOTE — ED AVS SNAPSHOT
[facility]  [address]  [place] [address]  Phone:  [phone]  Fax:  [phone]                                    Martin Bryant   MRN: [id number]    Department:  [facility]   Date of Visit:  8/23/2019           After Visit Summary Signature Page    I have received my discharge instructions, and my questions have been answered. I have discussed any challenges I see with this plan with the nurse or doctor.    ..........................................................................................................................................  Patient/Patient Representative Signature      ..........................................................................................................................................  Patient Representative Print Name and Relationship to Patient    ..................................................               ................................................  Date                                   Time    ..........................................................................................................................................  Reviewed by Signature/Title    ...................................................              ..............................................  Date                                               Time          22EPIC Rev 08/18

## 2019-08-23 NOTE — ED PROVIDER NOTES
History     Chief Complaint   Patient presents with     Abdominal Pain     This is a 64-year-old male who reports he has had inguinal hernias in the past.  He has been battling a left inguinal hernia.  [facility] today with some abdominal pain and he has not had a good BM in 2 days.  He feels his appetite has decreased but denies any nausea or emesis at this time but he does report he did have one episode of emesis last night.  Rates pain is maybe a 5 out of 10.  Denies any other issues.            Allergies:  No Known Allergies    Problem List:    Patient Active Problem List    Diagnosis Date Noted     Nephrolithiasis 06/04/2018     Priority: Medium     Psychosexual dysfunction with inhibited sexual excitement 02/23/2018     Priority: Medium     Rosacea 02/23/2018     Priority: Medium     Tobacco abuse 02/23/2018     Priority: Medium     Gastroesophageal reflux disease without esophagitis 07/25/2017     Priority: Medium     Osteoarthrosis 07/25/2017     Priority: Medium        Past Medical History:    Past Medical History:   Diagnosis Date     Epigastric pain      Nicotine dependence, uncomplicated      Rosacea        Past Surgical History:    Past Surgical History:   Procedure Laterality Date     HERNIA REPAIR Bilateral      PAROTIDECTOMY Left 2012       Family History:    Family History   Problem Relation Age of Onset     Prostate Cancer Father      Cancer Sister         Bone       Social History:  Marital Status:   [2]  Social History     Tobacco Use     Smoking status: Current Every Day Smoker     Packs/day: 0.25     Years: 40.00     Pack years: 10.00     Types: Cigarettes     Smokeless tobacco: Never Used   Substance Use Topics     Alcohol use: No     Comment: Alcoholic Drinks/day: rare use.     Drug use: No     Types: Other     Comment: Drug use: No        Medications:      omeprazole (PRILOSEC) 20 MG CR capsule   glucosamine-chondroitin 500-400 MG CAPS per capsule  "        Review of Systems   Constitutional: Positive for appetite change. Negative for chills and fever.   HENT: Negative for congestion, facial swelling, nosebleeds, sore throat and trouble swallowing.    Eyes: Negative for visual disturbance.   Respiratory: Negative for chest tightness and shortness of breath.    Cardiovascular: Negative for chest pain.   Gastrointestinal: Positive for abdominal pain and constipation. Negative for blood in stool, diarrhea, nausea and vomiting.   Genitourinary: Negative for hematuria.   Musculoskeletal: Negative for back pain and neck pain.   Skin: Negative for rash and wound.   Neurological: Negative for syncope.   Hematological: Negative for adenopathy. Does not bruise/bleed easily.   Psychiatric/Behavioral: Negative for confusion.       Physical Exam   BP: 126/84  Heart Rate: 79  Temp: 98.4  F (36.9  C)  Resp: 16  Height: 168.9 cm (5' 6.5\")  Weight: 79.4 kg (175 lb 1.6 oz)  SpO2: 94 %      Physical Exam   Constitutional: He is oriented to person, place, and time. He appears well-developed and well-nourished. No distress.   HENT:   Head: Normocephalic and atraumatic.   Eyes: Pupils are equal, round, and reactive to light. Conjunctivae and EOM are normal. No scleral icterus.   Neck: Neck supple.   Cardiovascular: Normal rate and regular rhythm.   Pulmonary/Chest: Effort normal and breath sounds normal.   Lung sounds are clear.  SaO2 is 94% on room air.  He does not appear to be in any respiratory distress.   Abdominal: Soft. There is tenderness.   Large bulging to the left inguinal area he does have some areas of firmness and hardness.  I am unable to reduce the hernia successfully.  Minimal tenderness palpation.  Bowel sounds are hypoactive.  No rebound   Musculoskeletal: Normal range of motion. He exhibits no deformity.   Lymphadenopathy:     He has no cervical adenopathy.   Neurological: He is alert and oriented to person, place, and time.   Skin: Skin is warm and dry. " Capillary refill takes less than 2 seconds. No rash noted. He is not diaphoretic.   Psychiatric: He has a normal mood and affect.       ED Course        Procedures           Results for orders placed or performed during the hospital encounter of 08/23/19 (from the past 24 hour(s))   US Abdomen Limited Portable    Narrative    PROCEDURES: US ABDOMEN LIMITED PORTABLE    HISTORY: left inguinal hernia.     TECHNIQUE: Ultrasound of the left lower quadrant was performed.    COMPARISON: None.     FINDINGS:    There is a area of bulging in the left lower quadrant measuring 9.9 x  11.5 cm, likely an inguinal hernia. There is trace free fluid in this  region. This does not change in position with Valsalva maneuver.      Impression    IMPRESSION: Probable left inguinal hernia.    ESTELLE WOOD MD   XR Abdomen 2 Views    Narrative    PROCEDURE:  XR ABDOMEN 2 VW    HISTORY:  nausea.     TECHNIQUE:  Upright and supine views of the abdomen were obtained.    COMPARISON:  None.    FINDINGS:     There is a nonspecific bowel gas pattern with multiple air-fluid  levels throughout small bowel. No definite bowel dilatation. No free  air.      Impression    IMPRESSION:    Nonspecific bowel gas pattern.    ESTELLE WOOD MD       Medications - No data to display    Assessments & Plan (with Medical Decision Making)     I have reviewed the nursing notes.    I have reviewed the findings, diagnosis, plan and need for follow up with the patient.       ED to Inpatient Handoff:    Discussed with   Dr. Jackson at [facility]  Patient accepted for surgery to the OR  Pending studies include none  Code Status: Not Addressed           New Prescriptions    No medications on file       Final diagnoses:   Inguinal hernia with strangulation - left     Afebrile.  Vital signs stable.  Patient with left inguinal hernia on examination which I am unable to reduce.  Patient has decreased appetite and decreasing BMs.  Concern for granulation.  Abdominal x-ray show  multiple fluid levels which are nonspecific but worrisome for SBO.  Ultrasound left inguinal area shows a 9 x 11 cm inguinal hernia.  I did discuss this case with Dr. Jackson who evaluated this patient in the ER.  After his evaluation the plan is to bring the patient to the operating room for herniorrhaphy.  Otherwise he is medically cleared for the surgery at this time peer    8/23/2019   [facility] AND [facility]     Devante Bryson PA-C  08/23/19 [id number]       Devante Bryson PA-C  08/23/19 [id number]

## 2019-08-23 NOTE — BRIEF OP NOTE
[facility]    Brief Operative Note    Pre-operative diagnosis: strangulated left inguinal hernia  Post-operative diagnosis strangulated left inguinal hernia - indirect  Procedure: Procedure(s):  Left HERNIa repair , INGUINAL, with Mesh  Surgeon: Surgeon(s) and Role:     * Roderick Jackson MD - Primary  Anesthesia: General   Estimated blood loss: 50 mL  Drains: None  Specimens: * No specimens in log *  Findings:   incarcerated left inguinal hernia, indirect .  Complications: None.  Implants:    Implant Name Type Inv. Item Serial No.  Lot No. LRB No. Used   MESH PROGRIP [id number]&quot; PARIETEX RIGHT [id number] Mesh MESH PROGRIP 4.7X3&quot; PAR[employer] RIGHT [id number]  [facility] [id number] Left 1

## 2019-08-23 NOTE — OR NURSING
PACU Transfer Note    Martin Bryant was transferred to Room 332 via cart.  Equipment used for transport:  [employer].  Accompanied by:  Jojo Rowley RN and Hailee HENDRICKS RN.  Prescriptions were: None    PACU Respiratory Event Documentation     1) Episodes of Apnea greater than or equal to 10 seconds: No    2) Bradypnea - less than 8 breaths per minute: No    3) Pain score on 0 to 10 scale: 0    4) Pain-sedation mismatch (yes or no): No    5) Repeated 02 desaturation less than 90% (yes or no): No    Anesthesia notified? (yes or no): No    Any of the above events occuring repeatedly in separate 30 minute intervals may be considered recurrent PACU respiratory events.    Patient stable and meets phase 1 discharge criteria for transport from PACU.

## 2019-08-23 NOTE — PROGRESS NOTES
SUBJECTIVE:   Martin Bryant is a 64 year old male who presents to clinic today for the following health issues:    HPI  Patient presents with 2 days of intermittent abdominal pain.  Notes that he is not able have a bowel movement, generally goes every day.  Has a history of inguinal hernia that is enlarged and painful.  Has taken laxatives without results.  No nausea or vomiting.      Patient Active Problem List    Diagnosis Date Noted     Nephrolithiasis 06/04/2018     Priority: Medium     Psychosexual dysfunction with inhibited sexual excitement 02/23/2018     Priority: Medium     Rosacea 02/23/2018     Priority: Medium     Tobacco abuse 02/23/2018     Priority: Medium     Gastroesophageal reflux disease without esophagitis 07/25/2017     Priority: Medium     Osteoarthrosis 07/25/2017     Priority: Medium     Past Medical History:   Diagnosis Date     Epigastric pain     No Comments Provided     Nicotine dependence, uncomplicated     No Comments Provided     Rosacea     No Comments Provided      Past Surgical History:   Procedure Laterality Date     HERNIA REPAIR Bilateral      PAROTIDECTOMY Left 2012     Family History   Problem Relation Age of Onset     Prostate Cancer Father      Cancer Sister         Bone     Social History     Tobacco Use     Smoking status: Current Every Day Smoker     Packs/day: 0.25     Years: 40.00     Pack years: 10.00     Types: Cigarettes     Smokeless tobacco: Never Used   Substance Use Topics     Alcohol use: No     Comment: Alcoholic Drinks/day: rare use.     Social History     Social History Narrative     and works at [employer].  Grown children and 2 grandchildren.  Tobacco use 1 ppd - ongoing.     Current Outpatient Medications   Medication Sig Dispense Refill     bisacodyl (DULCOLAX) 5 MG EC tablet Take as directed by colonoscopy prep instructions 2 tablet 0     glucosamine-chondroitin 500-400 MG CAPS per capsule Take 1 capsule by mouth 2 times daily       omeprazole  "(PRILOSEC) 20 MG CR capsule Take 20 mg by mouth daily       No Known Allergies    Review of Systems   See HPI    OBJECTIVE:     /84 (BP Location: Right arm, Patient Position: Sitting, Cuff Size: Adult Regular)   Pulse 79   Temp 98.4  F (36.9  C) (Tympanic)   Resp 16   Ht 1.689 m (5' 6.5\")   Wt 79.4 kg (175 lb 1.6 oz)   SpO2 94%   BMI 27.84 kg/m    Body mass index is 27.84 kg/m .  Physical Exam   Constitutional: He is oriented to person, place, and time. He appears well-developed and well-nourished.   He appears uncomfortable.   Cardiovascular: Normal rate.   Pulmonary/Chest: Effort normal.   Neurological: He is alert and oriented to person, place, and time.   Skin: Skin is warm and dry. He is not diaphoretic.       Diagnostic Test Results:  No results found for this or any previous visit (from the past 24 hour(s)).    ASSESSMENT/PLAN:       ICD-10-CM    1. Abdominal pain, generalized R10.84      PLAN:  Due to patient's complaint of abdominal pain and concern with incarcerated hernia will have nurse transport him to the ED for further evaluation and management. I explained my recommendations to patient who voiced understanding.    Disclaimer:  This note consists of words and symbols derived from keyboarding, dictation, or using voice recognition software. As a result, there may be errors in the script that have gone undetected. Please consider this when interpreting information found in this note.      EDGARD Bledsoe, NP-C  8/23/2019 at 10:37 AM  [facility]      "

## 2019-08-24 VITALS
BODY MASS INDEX: 28.38 KG/M2 | TEMPERATURE: 97.9 F | OXYGEN SATURATION: 93 % | WEIGHT: 176.6 LBS | HEIGHT: 66 IN | DIASTOLIC BLOOD PRESSURE: 65 MMHG | RESPIRATION RATE: 16 BRPM | SYSTOLIC BLOOD PRESSURE: 107 MMHG | HEART RATE: 96 BPM

## 2019-08-24 PROCEDURE — 25800030 ZZH RX IP 258 OP 636: Performed by: SURGERY

## 2019-08-24 PROCEDURE — 25000132 ZZH RX MED GY IP 250 OP 250 PS 637: Performed by: SURGERY

## 2019-08-24 PROCEDURE — 99024 POSTOP FOLLOW-UP VISIT: CPT | Performed by: SURGERY

## 2019-08-24 RX ORDER — SENNA AND DOCUSATE SODIUM 50; 8.6 MG/1; MG/1
1 TABLET, FILM COATED ORAL AT BEDTIME
Qty: 30 TABLET | Refills: 0 | Status: SHIPPED | OUTPATIENT
Start: 2019-08-24 | End: 2019-09-10

## 2019-08-24 RX ORDER — OXYCODONE AND ACETAMINOPHEN 5; 325 MG/1; MG/1
1 TABLET ORAL EVERY 6 HOURS PRN
Qty: 12 TABLET | Refills: 0 | Status: SHIPPED | OUTPATIENT
Start: 2019-08-24 | End: 2019-09-10

## 2019-08-24 RX ADMIN — SODIUM CHLORIDE, POTASSIUM CHLORIDE, SODIUM LACTATE AND CALCIUM CHLORIDE: 600; 310; 30; 20 INJECTION, SOLUTION INTRAVENOUS at 05:26

## 2019-08-24 RX ADMIN — ACETAMINOPHEN 975 MG: 325 TABLET, FILM COATED ORAL at 02:16

## 2019-08-24 RX ADMIN — PANTOPRAZOLE SODIUM 40 MG: 40 TABLET, DELAYED RELEASE ORAL at 07:30

## 2019-08-24 ASSESSMENT — ACTIVITIES OF DAILY LIVING (ADL)
ADLS_ACUITY_SCORE: 11

## 2019-08-24 ASSESSMENT — MIFFLIN-ST. JEOR: SCORE: 1541.67

## 2019-08-24 NOTE — PHARMACY - DISCHARGE MEDICATION RECONCILIATION AND EDUCATION
Pharmacy:  Discharge Counseling and Medication Reconciliation    Martin Bryant  [address] [place] [id number] [phone] (home)   64 year old male  PCP: Obi Khan    Allergies: Patient has no known allergies.    Discharge Counseling:    Pharmacist met with patient (and/or family) today to review the medication portion of the After Visit Summary (with an emphasis on NEW medications) and to address patient's questions/concerns.    Summary of Education: indication, correct use, side effects, and toxicity precautions discussed regarding new medications      Materials Provided:  MedCounselor sheets printed from Clinical Pharmacology on: Percocet and senna    Discharge Medication Reconciliation:    Beatrice Parra RPH has reviewed the patient's discharge medication orders and has compared them to the inpatient medication administration record and to what the patient was taking prior to admission - any discrepancies have been resolved.    It has been determined that the patient has an adequate supply of medications available or which can be obtained from the patient's preferred pharmacy.    Thank you for the consult.    Beatrice Parra RPH........August 24, 2019 10:30 AM

## 2019-08-24 NOTE — PROGRESS NOTES
.Discharge Note      Data:  Martin Bryant discharged to home at 1029 via ambulation. Accompanied by spouse and daughter and staff.    Action:  Written discharge/follow-up instructions were provided to patient, spouse and daughter. Prescriptions sent with patient to fill . All belongings sent with patient.    Response:  Patient and wife verbalized understanding of discharge instructions, reason for discharge, and necessary follow-up appointments.     Anais Fisher RN on 8/24/2019 at 11:06 AM

## 2019-08-24 NOTE — DISCHARGE SUMMARY
[facility] Discharge Summary    Martin Bryant MRN# [id number]   Age: 64 year old YOB: 1955     Date of Admission:  8/23/2019  Date of Discharge::  8/24/2019 10:28 AM  Admitting Physician:  Roderick Jackson MD  Discharge Physician:  Roderick Jackson MD     Home clinic: [facility]          Admission Diagnoses:   Inguinal hernia with strangulation [K40.30]          Discharge Diagnosis:     Inguinal hernia with strangulation [K40.30]          Procedures:     Procedure(s):  Left inguinal hernia repair with mesh                  Medications Prior to Admission:     Medications Prior to Admission   Medication Sig Dispense Refill Last Dose     omeprazole (PRILOSEC) 20 MG CR capsule Take 20 mg by mouth daily   8/23/2019 at Unknown time     glucosamine-chondroitin 500-400 MG CAPS per capsule Take 1 capsule by mouth 2 times daily   More than a month at Unknown time             Discharge Medications:     Current Discharge Medication List      START taking these medications    Details   oxyCODONE-acetaminophen (PERCOCET) 5-325 MG tablet Take 1 tablet by mouth every 6 hours as needed for pain  Qty: 12 tablet, Refills: 0    Associated Diagnoses: Strangulated inguinal hernia      SENNA-docusate sodium (SENNA S) 8.6-50 MG tablet Take 1 tablet by mouth At Bedtime  Qty: 30 tablet, Refills: 0    Associated Diagnoses: Strangulated inguinal hernia         CONTINUE these medications which have NOT CHANGED    Details   omeprazole (PRILOSEC) 20 MG CR capsule Take 20 mg by mouth daily      glucosamine-chondroitin 500-400 MG CAPS per capsule Take 1 capsule by mouth 2 times daily                   Consultations:   No consultations were requested during this admission          Brief History of Illness:   Reason for admission requiring a surgical or invasive procedure:   Extremely dense left inguinal hernia   The patient underwent the following procedure(s):   Left inguinal hernia repair with mesh   There were no  immediate complications during this procedure.    Please refer to the full operative summary for details.             Hospital Course:   The patient's hospital course was unremarkable.  He recovered as anticipated and experienced no post-operative complications.           Discharge Instructions and Follow-Up:     Discharge diet: Regular   Discharge activity:  No heavy lifting or strenuous activity for 4 weeks   Discharge follow-up:  Follow-up in surgery clinic in 2 weeks   Wound care:  Okay to shower today.  Do not submerge the wound for 2 weeks           Discharge Disposition:     Discharged to home      Attestation:  I have reviewed today's vital signs, notes, medications, labs and imaging.  Amount of time performed on this discharge summary: 15 minutes.    Roderick Jackson MD

## 2019-09-10 ENCOUNTER — OFFICE VISIT (OUTPATIENT)
Dept: SURGERY | Facility: OTHER | Age: 64
End: 2019-09-10
Attending: FAMILY MEDICINE
Payer: COMMERCIAL

## 2019-09-10 VITALS
RESPIRATION RATE: 18 BRPM | WEIGHT: 176.6 LBS | SYSTOLIC BLOOD PRESSURE: 122 MMHG | HEART RATE: 93 BPM | BODY MASS INDEX: 28.08 KG/M2 | DIASTOLIC BLOOD PRESSURE: 76 MMHG | TEMPERATURE: 97.7 F

## 2019-09-10 DIAGNOSIS — Z98.890 POSTOPERATIVE STATE: Primary | ICD-10-CM

## 2019-09-10 PROBLEM — K40.30 STRANGULATED INGUINAL HERNIA: Status: RESOLVED | Noted: 2019-08-23 | Resolved: 2019-09-10

## 2019-09-10 PROCEDURE — 99024 POSTOP FOLLOW-UP VISIT: CPT | Performed by: SURGERY

## 2019-09-10 ASSESSMENT — PAIN SCALES - GENERAL: PAINLEVEL: NO PAIN (0)

## 2019-09-10 NOTE — NURSING NOTE
"Chief Complaint   Patient presents with     Surgical Followup     hernia       Initial /76 (BP Location: Right arm, Patient Position: Sitting, Cuff Size: Adult Large)   Pulse 93   Temp 97.7  F (36.5  C) (Temporal)   Resp 18   Wt 80.1 kg (176 lb 9.6 oz)   BMI 28.08 kg/m   Estimated body mass index is 28.08 kg/m  as calculated from the following:    Height as of 8/23/19: 1.689 m (5' 6.5\").    Weight as of this encounter: 80.1 kg (176 lb 9.6 oz).  Medication Reconciliation: complete    Kandy Dhaliwal LPN  "

## 2019-09-10 NOTE — PROGRESS NOTES
Martin Bryant presents clinic for follow-up of open left inguinal hernia repair with mesh for strangulated recurrent left inguinal hernia.  Patient says he is been doing well since the time of surgery.  His pain is slowly gotten better.  He is tolerating regular diet and bowel movements are normal.  He denies urinary symptoms.  He says he has had no problems the incision.  He says he does feel a lump beneath the incision.  It is mildly painful.  It does not go away when he lies down.  He feels like his been getting a little bit better.      /76 (BP Location: Right arm, Patient Position: Sitting, Cuff Size: Adult Large)   Pulse 93   Temp 97.7  F (36.5  C) (Temporal)   Resp 18   Wt 80.1 kg (176 lb 9.6 oz)   BMI 28.08 kg/m      Left groin incision is clean, dry, intact.  No surrounding erythema or induration.  Large fixed bulge beneath the skin.  This is not reducible and it is not mobile.  It is slightly tender to palpation.      Martin Bryant is a 64-year-old male status post open left inguinal hernia repair with mesh.  Patient is doing well and slowly getting back into his normal activities, there are no signs of wound infection.  The firm lump beneath the incision feels like a seroma or hematoma.  Patient does not have pain or obstructive symptoms to definitively diagnose hernia recurrence.  Patient will monitor the area closely and call the clinic if he gets larger or more painful.  Otherwise patient will follow-up in 1 month for re-evaluation and if the mass persists we will CT scan the area to look for recurrence.      EVA Jackson MD

## 2021-10-11 ENCOUNTER — OFFICE VISIT (OUTPATIENT)
Dept: INTERNAL MEDICINE | Facility: OTHER | Age: 66
End: 2021-10-11
Attending: INTERNAL MEDICINE
Payer: MEDICARE

## 2021-10-11 VITALS
OXYGEN SATURATION: 97 % | WEIGHT: 180.6 LBS | HEART RATE: 89 BPM | TEMPERATURE: 98.1 F | HEIGHT: 67 IN | RESPIRATION RATE: 16 BRPM | DIASTOLIC BLOOD PRESSURE: 82 MMHG | BODY MASS INDEX: 28.35 KG/M2 | SYSTOLIC BLOOD PRESSURE: 136 MMHG

## 2021-10-11 DIAGNOSIS — E78.2 MIXED HYPERLIPIDEMIA: ICD-10-CM

## 2021-10-11 DIAGNOSIS — R19.5 POSITIVE COLORECTAL CANCER SCREENING USING COLOGUARD TEST: ICD-10-CM

## 2021-10-11 DIAGNOSIS — Z12.11 COLON CANCER SCREENING: ICD-10-CM

## 2021-10-11 DIAGNOSIS — N42.9 DISORDER OF PROSTATE, UNSPECIFIED: ICD-10-CM

## 2021-10-11 DIAGNOSIS — Z80.42 FAMILY HISTORY OF PROSTATE CANCER IN FATHER: ICD-10-CM

## 2021-10-11 DIAGNOSIS — Z87.891 PERSONAL HISTORY OF TOBACCO USE: ICD-10-CM

## 2021-10-11 DIAGNOSIS — Z23 NEED FOR IMMUNIZATION AGAINST INFLUENZA: Primary | ICD-10-CM

## 2021-10-11 DIAGNOSIS — Z13.6 SCREENING FOR AAA (ABDOMINAL AORTIC ANEURYSM): ICD-10-CM

## 2021-10-11 DIAGNOSIS — N52.8 OTHER MALE ERECTILE DYSFUNCTION: ICD-10-CM

## 2021-10-11 LAB
ALBUMIN SERPL-MCNC: 4.2 G/DL (ref 3.5–5.7)
ALP SERPL-CCNC: 72 U/L (ref 34–104)
ALT SERPL W P-5'-P-CCNC: 14 U/L (ref 7–52)
ANION GAP SERPL CALCULATED.3IONS-SCNC: 4 MMOL/L (ref 3–14)
AST SERPL W P-5'-P-CCNC: 14 U/L (ref 13–39)
BILIRUB SERPL-MCNC: 0.6 MG/DL (ref 0.3–1)
BUN SERPL-MCNC: 18 MG/DL (ref 7–25)
CALCIUM SERPL-MCNC: 9.5 MG/DL (ref 8.6–10.3)
CHLORIDE BLD-SCNC: 104 MMOL/L (ref 98–107)
CHOLEST SERPL-MCNC: 179 MG/DL
CO2 SERPL-SCNC: 29 MMOL/L (ref 21–31)
CREAT SERPL-MCNC: 0.89 MG/DL (ref 0.7–1.3)
FASTING STATUS PATIENT QL REPORTED: YES
GFR SERPL CREATININE-BSD FRML MDRD: 89 ML/MIN/1.73M2
GLUCOSE BLD-MCNC: 103 MG/DL (ref 70–105)
HDLC SERPL-MCNC: 37 MG/DL (ref 23–92)
HOLD SPECIMEN: NORMAL
LDLC SERPL CALC-MCNC: 125 MG/DL
NONHDLC SERPL-MCNC: 142 MG/DL
POTASSIUM BLD-SCNC: 4.4 MMOL/L (ref 3.5–5.1)
PROT SERPL-MCNC: 7 G/DL (ref 6.4–8.9)
PSA SERPL-MCNC: 1.11 UG/L (ref 0–4)
SODIUM SERPL-SCNC: 137 MMOL/L (ref 134–144)
TRIGL SERPL-MCNC: 83 MG/DL

## 2021-10-11 PROCEDURE — 90662 IIV NO PRSV INCREASED AG IM: CPT

## 2021-10-11 PROCEDURE — G0463 HOSPITAL OUTPT CLINIC VISIT: HCPCS | Mod: 25

## 2021-10-11 PROCEDURE — G0008 ADMIN INFLUENZA VIRUS VAC: HCPCS

## 2021-10-11 PROCEDURE — G0439 PPPS, SUBSEQ VISIT: HCPCS | Performed by: INTERNAL MEDICINE

## 2021-10-11 PROCEDURE — G0009 ADMIN PNEUMOCOCCAL VACCINE: HCPCS

## 2021-10-11 PROCEDURE — 99213 OFFICE O/P EST LOW 20 MIN: CPT | Mod: 25 | Performed by: INTERNAL MEDICINE

## 2021-10-11 PROCEDURE — 84153 ASSAY OF PSA TOTAL: CPT | Mod: ZL | Performed by: INTERNAL MEDICINE

## 2021-10-11 PROCEDURE — 80061 LIPID PANEL: CPT | Mod: ZL | Performed by: INTERNAL MEDICINE

## 2021-10-11 PROCEDURE — 82040 ASSAY OF SERUM ALBUMIN: CPT | Mod: ZL | Performed by: INTERNAL MEDICINE

## 2021-10-11 PROCEDURE — 36415 COLL VENOUS BLD VENIPUNCTURE: CPT | Mod: ZL | Performed by: INTERNAL MEDICINE

## 2021-10-11 PROCEDURE — G0296 VISIT TO DETERM LDCT ELIG: HCPCS | Performed by: INTERNAL MEDICINE

## 2021-10-11 RX ORDER — SILDENAFIL 100 MG/1
100 TABLET, FILM COATED ORAL DAILY PRN
Qty: 10 TABLET | Refills: 5 | Status: SHIPPED | OUTPATIENT
Start: 2021-10-11 | End: 2022-11-22

## 2021-10-11 RX ORDER — FAMOTIDINE 20 MG/1
20 TABLET, FILM COATED ORAL EVERY OTHER DAY
COMMUNITY
End: 2022-11-22

## 2021-10-11 ASSESSMENT — PAIN SCALES - GENERAL: PAINLEVEL: NO PAIN (0)

## 2021-10-11 ASSESSMENT — MIFFLIN-ST. JEOR: SCORE: 1562.95

## 2021-10-11 NOTE — PROGRESS NOTES
SUBJECTIVE:   Martin Bryant is a 66 year old male who presents for Preventive Visit.      Patient has been advised of split billing requirements and indicates understanding: Yes   Are you in the first 12 months of your Medicare coverage?  No    HPI     This patient is here today for a wellness physical.  He has not been in to see me for over 3 years.  He has a history of reflux disease and takes famotidine and/or omeprazole to control his symptoms with fairly good results.  His only other medications are over-the-counter vitamin supplements.    He currently has a complaint of erectile dysfunction.  He is interested in trying something for this.  He does have urinary frequency as well as some hesitancy and nocturia.  Other than that, he has no particular complaints or concerns.    He is due for a number of health maintenance things.  He has never had colon cancer screening so we reviewed that today.  He would be interested in doing the Cologuard so that is ordered.  He is a long-term cigarette smoker with no interest in quitting.  I reviewed with him CT lung cancer screening and he would be willing to have that done so that is ordered as well.  From an immunization standpoint, he is due for a flu shot as well as a Pneumovax.  He has had his Covid vaccine.    He has not been seen by anybody for some 2 years, that was when he had an incarcerated inguinal hernia and surgery.  He tells me that he has done well since that time with no major complaints or concerns.  His blood pressure today is acceptable.  He has had no further difficulties with kidney stones as he had in the past.    Medications are reconciled.  Past medical history, past surgical history, family history and social histories are reviewed and updated.    Do you feel safe in your environment?     Have you ever done Advance Care Planning? (For example, a Health Directive, POLST, or a discussion with a medical provider or your loved ones about your  wishes): No, advance care planning information given to patient to review.  Patient plans to discuss their wishes with loved ones or provider.         Fall risk  Fallen 2 or more times in the past year?: No  Any fall with injury in the past year?: Yes    Cognitive Screening   1) Repeat 3 items (Leader, Season, Table)    2) Clock draw: NORMAL  3) 3 item recall: Recalls 2 objects   Results: NORMAL clock, 1-2 items recalled: COGNITIVE IMPAIRMENT LESS LIKELY    Mini-CogTM Copyright GALINA Echeverria. Licensed by the author for use in [facility]; reprinted with permission (mely@[facility]). All rights reserved.      Do you have sleep apnea, excessive snoring or daytime drowsiness?: no    Reviewed and updated as needed this visit by clinical staff  Tobacco  Allergies  Meds  Problems  Med Hx  Surg Hx  Fam Hx  Soc Hx          Reviewed and updated as needed this visit by Provider  Tobacco  Allergies  Meds  Problems  Med Hx  Surg Hx  Fam Hx         Social History     Tobacco Use     Smoking status: Current Every Day Smoker     Packs/day: 0.25     Years: 40.00     Pack years: 10.00     Types: Cigarettes     Smokeless tobacco: Never Used   Substance Use Topics     Alcohol use: No     Comment: Alcoholic Drinks/day: rare use.     If you drink alcohol do you typically have >3 drinks per day or >7 drinks per week? Not applicable          Current Outpatient Medications   Medication     Ascorbic Acid (VITAMIN C PO)     ELDERBERRY PO     famotidine (PEPCID) 20 MG tablet     omeprazole (PRILOSEC) 20 MG CR capsule     VITAMIN D PO     No current facility-administered medications for this visit.         Current providers sharing in care for this patient include:   Patient Care Team:  Obi Khan MD as PCP - General (Internal Medicine)    The following health maintenance items are reviewed in Epic and correct as of today:  Health Maintenance Due   Topic Date Due     Pneumococcal Vaccine: 65+ Years (1 of 2 - PPSV23)  "Never done     DTAP/TDAP/TD IMMUNIZATION (1 - Tdap) 05/06/2003     INFLUENZA VACCINE (1) Never done     Lab work is in process          Review of Systems  Constitutional, HEENT, cardiovascular, pulmonary, GI, , musculoskeletal, neuro, skin, endocrine and psych systems are negative, except as otherwise noted.    OBJECTIVE:   /82 (BP Location: Right arm, Patient Position: Sitting, Cuff Size: Adult Regular)   Pulse 89   Temp 98.1  F (36.7  C) (Tympanic)   Resp 16   Ht 1.71 m (5' 7.32\")   Wt 81.9 kg (180 lb 9.6 oz)   SpO2 97%   BMI 28.02 kg/m   Estimated body mass index is 28.02 kg/m  as calculated from the following:    Height as of this encounter: 1.71 m (5' 7.32\").    Weight as of this encounter: 81.9 kg (180 lb 9.6 oz).  Physical Exam  GENERAL: healthy, alert and no distress  EYES: Eyes grossly normal to inspection, PERRL and conjunctivae and sclerae normal  HENT: ear canals and TM's normal, nose and mouth without ulcers or lesions  NECK: no adenopathy, no asymmetry, masses, or scars and thyroid normal to palpation  RESP: Decreased breath sounds with scattered rhonchi  CV: regular rate and rhythm, normal S1 S2, no S3 or S4, no murmur, click or rub, no peripheral edema and peripheral pulses strong  ABDOMEN: soft, nontender, no hepatosplenomegaly, no masses and bowel sounds normal  : Normal male, no hernia.  Prostate 3+ with left-sided prominence but no distinct mass  MS: no gross musculoskeletal defects noted, no edema  SKIN: no suspicious lesions or rashes  NEURO: Normal strength and tone, mentation intact and speech normal  PSYCH: mentation appears normal, affect normal/bright        ASSESSMENT / PLAN:       ICD-10-CM    1. Need for immunization against influenza  Z23 FLU SHOT 65+ (FLUZONE HD)   2. Colon cancer screening  Z12.11 [facility](EXACT SCIENCES)   3. Personal history of tobacco use  Z87.891 Prof fee: Shared Decisionmaking for Lung Cancer Screening     CT Chest Lung Cancer Scrn Low Dose wo " "  4. Screening for AAA (abdominal aortic aneurysm)  Z13.6 Abdominal Aortic Aneurysm Screening/Tracking   5. Other male erectile dysfunction  N52.8    6. Family history of prostate cancer in father  Z80.42        Patient has been advised of split billing requirements and indicates understanding: Yes  COUNSELING:    He will continue with his current medications.  We talked about his erectile dysfunction and I did give him a prescription for Viagra to try as needed.  He can try half tablet to increase to a whole tablet if needed.  Warned of possible side effects.  Complete lab drawn and pending.  This will include a PSA because of his lower urinary tract symptoms and the family history of prostate cancer.  Pneumovax and flu shots given today.  After discussion of options, Cologuard is also ordered for colon cancer screening.  Discussion regarding his smoking, strongly encouraged him to discontinue this habit.  He is scheduled for a CT for screening reasons, I will let him know the results.  I did review with him today the long-term consequences of continued cigarette smoking including lung cancer, vascular disease and emphysema.  At the present time he likely does have COPD but is not particularly symptomatic and does not want any further investigation or evaluation.  Encouraged him to work on a healthy diet and regular exercise.  I do think that if his cholesterol is elevated, given his smoking history etc. we should consider adding statin therapy to his regimen.  Assuming all goes well and his lab and CTs, etc. are unremarkable, he will follow-up on an annual basis.    Reviewed preventive health counseling, as reflected in patient instructions       Regular exercise       Healthy diet/nutrition    Estimated body mass index is 28.02 kg/m  as calculated from the following:    Height as of this encounter: 1.71 m (5' 7.32\").    Weight as of this encounter: 81.9 kg (180 lb 9.6 oz).    Weight management plan: Discussed " healthy diet and exercise guidelines    He reports that he has been smoking cigarettes. He has a 10.00 pack-year smoking history. He has never used smokeless tobacco.  Tobacco Cessation Action Plan:   Information offered: Patient not interested at this time      Appropriate preventive services were discussed with this patient, including applicable screening as appropriate for cardiovascular disease, diabetes, osteopenia/osteoporosis, and glaucoma.  As appropriate for age/gender, discussed screening for colorectal cancer, prostate cancer, breast cancer, and cervical cancer. Checklist reviewing preventive services available has been given to the patient.    Reviewed patients plan of care and provided an AVS. The Basic Care Plan (routine screening as documented in Health Maintenance) for Martin meets the Care Plan requirement. This Care Plan has been established and reviewed with the Patient.    Counseling Resources:  ATP IV Guidelines  Pooled Cohorts Equation Calculator  Breast Cancer Risk Calculator  Breast Cancer: Medication to Reduce Risk  FRAX Risk Assessment  ICSI Preventive Guidelines  Dietary Guidelines for Americans, 2010  USDA's MyPlate  ASA Prophylaxis  Lung CA Screening    TAWNY RODGERS MD  [facility]    Identified Health Risks:

## 2021-10-11 NOTE — LETTER
November 1, 2021        Martin Bryant  [address] [address]        Dear Martin,    Your recent Cologuard testing has returned and is positive.  This could be representative of a polyp or even a colon cancer.  At this point in time, you need to be scheduled for a colonoscopy.  I have placed an order for the colonoscopy and you should be contacted to get this scheduled.  If you have any questions in the interim, feel free to contact me.    Sincerely,        Obi Khan MD  Internal Medicine  [facility]

## 2021-10-11 NOTE — PATIENT INSTRUCTIONS
You need to quit smoking now.    You received a flu shot and pneumonia shot today.    They will call you to schedule you for the lung cancer screening CT scan and I will let you know the results.    You will get the Cologuard sent to you in the mail for colon cancer screening.    You can try Viagra to see if that helps with your erectile dysfunction.    Blood tests are pending today, I will send you a letter with the results.    Assuming all goes well, recheck in 1 year.  Lung Cancer Screening   Frequently Asked Questions  If you are at high-risk for lung cancer, getting screened with low-dose computed tomography (LDCT) every year can help save your life. This handout offers answers to some of the most common questions about lung cancer screening. If you have other questions, please call [phone] ([phone]).     What is it?  Lung cancer screening uses special X-ray technology to create an image of your lung tissue. The exam is quick and easy and takes less than 10 seconds. We don t give you any medicine or use any needles. You can eat before and after the exam. You don t need to change your clothes as long as the clothing on your chest doesn t contain metal. But, you do need to be able to hold your breath for at least 6 seconds during the exam.    What is the goal of lung cancer screening?  The goal of lung cancer screening is to save lives. Many times, lung cancer is not found until a person starts having physical symptoms. Lung cancer screening can help detect lung cancer in the earliest stages when it may be easier to treat.    Who should be screened for lung cancer?  We suggest lung cancer screening for anyone who is at high-risk for lung cancer. You are in the high-risk group if you:      are between the ages of 55 and 79, and    have smoked at least 1 pack of cigarettes a day for 30 or more years, and    still smoke or have quit within the past 15 years.    However, if you have a new cough or  shortness of breath, you should talk to your doctor before being screened.    Some national lung health advocacy groups also recommend screening for people ages 50 to 79 who have smoked an average of 1 pack of cigarettes a day for 20 years. They must also have at least 1 other risk factor for lung cancer, not including exposure to secondhand smoke. Other risk factors are having had cancer in the past, emphysema, pulmonary fibrosis, COPD, a family history of lung cancer, or exposure to certain materials such as arsenic, asbestos, beryllium, cadmium, chromium, diesel fumes, nickel, radon or silica. Your care team can help you know if you have one of these risk factors.     Why does it matter if I have symptoms?  Certain symptoms can be a sign that you have a condition in your lungs that should be checked and treated by your doctor. These symptoms include fever, chest pain, a new or changing cough, shortness of breath that you have never felt before, coughing up blood or unexplained weight loss. Having any of these symptoms can greatly affect the results of lung cancer screening.       Should all smokers get an LDCT lung cancer screening exam?  It depends. Lung cancer screening is for a very specific group of men and women who have a history of heavy smoking over a long period of time (see  Who should be screened for lung cancer  above).  I am in the high-risk group, but have been diagnosed with cancer in the past. Is LDCT lung cancer screening right for me?  In some cases, you should not have LDCT lung screening, such as when your doctor is already following your cancer with CT scan studies. Your doctor will help you decide if LDCT lung screening is right for you.  Do I need to have a screening exam every year?  Yes. If you are in the high-risk group described earlier, you should get an LDCT lung cancer screening exam every year until you are 79, or are no longer willing or able to undergo screening and possible  procedures to diagnose and treat lung cancer.  How effective is LDCT at preventing death from lung cancer?  Studies have shown that LDCT lung cancer screening can lower the risk of death from lung cancer by 20 percent in people who are at high-risk.  What are the risks?  There are some risks and limitations of LDCT lung cancer screening. We want to make sure you understand the risks and benefits, so please let us know if you have any questions. Your doctor may want to talk with you more about these risks.    Radiation exposure: As with any exam that uses radiation, there is a very small increased risk of cancer. The amount of radiation in LDCT is small--about the same amount a person would get from a mammogram. Your doctor orders the exam when he or she feels the potential benefits outweigh the risks.    False negatives: No test is perfect, including LDCT. It is possible that you may have a medical condition, including lung cancer, that is not found during your exam. This is called a false negative result.    False positives and more testing: LDCT very often finds something in the lung that could be cancer, but in fact is not. This is called a false positive result. False positive tests often cause anxiety. To make sure these findings are not cancer, you may need to have more tests. These tests will be done only if you give us permission. Sometimes patients need a treatment that can have side effects, such as a biopsy. For more information on false positives, see  What can I expect from the results?     Findings not related to lung cancer: Your LDCT exam also takes pictures of areas of your body next to your lungs. In a very small number of cases, the CT scan will show an abnormal finding in one of these areas, such as your kidneys, adrenal glands, liver or thyroid. This finding may not be serious, but you may need more tests. Your doctor can help you decide what other tests you may need, if any.  What can I expect  from the results?  About 1 out of 4 LDCT exams will find something that may need more tests. Most of the time, these findings are lung nodules. Lung nodules are very small collections of tissue in the lung. These nodules are very common, and the vast majority--more than 97 percent--are not cancer (benign). Most are normal lymph nodes or small areas of scarring from past infections.  But, if a small lung nodule is found to be cancer, the cancer can be cured more than 90 percent of the time. To know if the nodule is cancer, we may need to get more images before your next yearly screening exam. If the nodule has suspicious features (for example, it is large, has an odd shape or grows over time), we will refer you to a specialist for further testing.  Will my doctor also get the results?  Yes. Your doctor will get a copy of your results.  Is it okay to keep smoking now that there s a cancer screening exam?  No. Tobacco is one of the strongest cancer-causing agents. It causes not only lung cancer, but other cancers and cardiovascular (heart) diseases as well. The damage caused by smoking builds over time. This means that the longer you smoke, the higher your risk of disease. While it is never too late to quit, the sooner you quit, the better.  Where can I find help to quit smoking?  The best way to prevent lung cancer is to stop smoking. If you have already quit smoking, congratulations and keep it up! For help on quitting smoking, please call [employer] at [phone] ([phone]) or the American Cancer Society at 1-649.453.2576 to find local resources near you.  One-on-one health coaching:  If you d prefer to work individually with a health care provider on tobacco cessation, we offer:      Medication Therapy Management:  Our specially trained pharmacists work closely with you and your doctor to help you quit smoking.  Call [phone] or [phone] (toll free).     Can Do: Health coaching offered by [facility].  [email or web address]

## 2021-10-11 NOTE — PROGRESS NOTES
Lung Cancer Screening Shared Decision Making Visit     Martin Bryant is eligible for lung cancer screening on the basis of the information provided in my signed lung cancer screening order.     I have discussed with patient the risks and benefits of screening for lung cancer with low-dose CT.     The risks include:  radiation exposure: one low dose chest CT has as much ionizing radiation as about 15 chest x-rays or 6 months of background radiation living in [place]    false positives: 96% of positive findings/nodules are NOT cancer, but some might still require additional diagnostic evaluation, including biopsy  over-diagnosis: some slow growing cancers that might never have been clinically significant will be detected and treated unnecessarily     The benefit of early detection of lung cancer is contingent upon adherence to annual screening or more frequent follow up if indicated.     Furthermore, reaping the benefits of screening requires Martin Bryant to be willing and physically able to undergo diagnostic procedures, if indicated. Although no specific guide is available for determining severity of comorbidities, it is reasonable to withhold screening in patients who have greater mortality risk from other diseases.     We did discuss that the only way to prevent lung cancer is to not smoke. Smoking cessation counseling was given, duration < 3 minutes.      I did not offer risk estimation using a calculator such as this one:    ShouldIScreen

## 2021-10-11 NOTE — PROGRESS NOTES
Medication Reconciliation: complete   Advance Care Directive Reviewed    FOOD SECURITY SCREENING QUESTIONS  Hunger Vital Signs:  Within the past 12 months we worried whether our food would run out before we got money to buy more. Never  Within the past 12 months the food we bought just didn't last and we didn't have money to get more. Never  Usha Sanches LPN .............10/11/2021  8:30 AM

## 2021-10-11 NOTE — NURSING NOTE
Immunization Documentation    Prior to Immunization administration, verified patients identity using patient's name and date of birth. Please see IMMUNIZATIONS  and order for additional information.  Patient / Parent instructed to remain in clinic for 15 minutes and report any adverse reaction to staff immediately.    Was entire vial of medication used? Yes  Vial/Syringe: Syringe    Usha Sanches LPN  10/11/2021   9:03 AM

## 2021-10-11 NOTE — LETTER
October 11, 2021      Martin Bryant  [address] [facility] [address]        Dear Martin,    Below are the results of your recent labs:    Results for orders placed or performed in visit on 10/11/21   Prostate Specific Antigen     Status: Normal   Result Value Ref Range    PSA Tumor Marker 1.11 0.00 - 4.00 ug/L    Narrative    The DXI Access PSAS WHO assay is a two site immunoenzymatic   assay. Assay values obtained with different assay methods cannot be used   interchangeably due to differences in assay methods and reagent specificity.   Lipid Panel     Status: Abnormal   Result Value Ref Range    Cholesterol 179 <200 mg/dL    Triglycerides 83 <150 mg/dL    Direct Measure HDL 37 23 - 92 mg/dL    LDL Cholesterol Calculated 125 (H) <=100 mg/dL    Non HDL Cholesterol 142 (H) <130 mg/dL    Patient Fasting > 8hrs? Yes     Narrative    Cholesterol  Desirable:  <200 mg/dL    Triglycerides  Normal:  Less than 150 mg/dL  Borderline High:  150-199 mg/dL  High:  200-499 mg/dL  Very High:  Greater than or equal to 500 mg/dL    Direct Measure HDL  Female:  Greater than or equal to 50 mg/dL   Male:  Greater than or equal to 40 mg/dL    LDL Cholesterol  Desirable:  <100mg/dL  Above Desirable:  100-129 mg/dL   Borderline High:  130-159 mg/dL   High:  160-189 mg/dL   Very High:  >= 190 mg/dL    Non HDL Cholesterol  Desirable:  130 mg/dL  Above Desirable:  130-159 mg/dL  Borderline High:  160-189 mg/dL  High:  190-219 mg/dL  Very High:  Greater than or equal to 220 mg/dL   Comprehensive metabolic panel     Status: Normal   Result Value Ref Range    Sodium 137 134 - 144 mmol/L    Potassium 4.4 3.5 - 5.1 mmol/L    Chloride 104 98 - 107 mmol/L    Carbon Dioxide (CO2) 29 21 - 31 mmol/L    Anion Gap 4 3 - 14 mmol/L    Urea Nitrogen 18 7 - 25 mg/dL    Creatinine 0.89 0.70 - 1.30 mg/dL    Calcium 9.5 8.6 - 10.3 mg/dL    Glucose 103 70 - 105 mg/dL    Alkaline Phosphatase 72 34 - 104 U/L    AST 14 13 - 39 U/L    ALT 14 7 - 52  U/L    Protein Total 7.0 6.4 - 8.9 g/dL    Albumin 4.2 3.5 - 5.7 g/dL    Bilirubin Total 0.6 0.3 - 1.0 mg/dL    GFR Estimate 89 >60 mL/min/1.73m2   Extra Tube     Status: None (In process)    Narrative    The following orders were created for panel order Extra Tube.  Procedure                               Abnormality         Status                     ---------                               -----------         ------                     Extra Purple Top Tube[[id number]]                            In process                   Please view results for these tests on the individual orders.        Your blood tests look fine.  Congratulations on this report.    Sincerely,        Obi Khan MD  Internal Medicine  [facility]

## 2021-10-18 LAB — COLOGUARD-ABSTRACT: POSITIVE

## 2021-10-22 ENCOUNTER — HOSPITAL ENCOUNTER (OUTPATIENT)
Dept: CT IMAGING | Facility: OTHER | Age: 66
Discharge: HOME | End: 2021-10-22
Attending: INTERNAL MEDICINE | Admitting: INTERNAL MEDICINE
Payer: MEDICARE

## 2021-10-22 DIAGNOSIS — Z87.891 PERSONAL HISTORY OF TOBACCO USE: ICD-10-CM

## 2021-10-22 PROCEDURE — 71271 CT THORAX LUNG CANCER SCR C-: CPT

## 2021-10-28 ENCOUNTER — TELEPHONE (OUTPATIENT)
Dept: INTERNAL MEDICINE | Facility: OTHER | Age: 66
End: 2021-10-28

## 2021-10-28 DIAGNOSIS — R91.1 LUNG NODULE: ICD-10-CM

## 2021-10-28 DIAGNOSIS — R91.8 PULMONARY NODULES: Primary | ICD-10-CM

## 2021-10-28 NOTE — TELEPHONE ENCOUNTER
Patient rec'd letter from CT results and said to contact their doctor-they are aware you are gone until Monday.

## 2021-10-28 NOTE — TELEPHONE ENCOUNTER
Called patient and wife with results from CT lung cancer screen.  PET/CT ordered.  Encouraged to set up a follow up with Dr Khan 2-3 days after completed.

## 2021-10-28 NOTE — TELEPHONE ENCOUNTER
Spoke to patient EC, they received a letter about ct results and said to contact provider. They are wondering if another provider can review as MAF is out until 11/1/21 and see if something needs to be done now or if ok to wait for MAF.  Blanca Harris LPN .............10/28/2021     1:57 PM

## 2021-11-02 ENCOUNTER — TELEPHONE (OUTPATIENT)
Dept: PET IMAGING | Facility: HOSPITAL | Age: 66
End: 2021-11-02

## 2021-11-03 ENCOUNTER — TELEPHONE (OUTPATIENT)
Dept: INTERNAL MEDICINE | Facility: OTHER | Age: 66
End: 2021-11-03

## 2021-11-03 ENCOUNTER — HOSPITAL ENCOUNTER (OUTPATIENT)
Dept: PET IMAGING | Facility: HOSPITAL | Age: 66
Discharge: HOME | End: 2021-11-03
Attending: INTERNAL MEDICINE | Admitting: INTERNAL MEDICINE
Payer: MEDICARE

## 2021-11-03 DIAGNOSIS — R91.1 LUNG NODULE: ICD-10-CM

## 2021-11-03 PROCEDURE — 343N000001 HC RX 343: Performed by: INTERNAL MEDICINE

## 2021-11-03 PROCEDURE — 78815 PET IMAGE W/CT SKULL-THIGH: CPT | Mod: PI

## 2021-11-03 PROCEDURE — A9552 F18 FDG: HCPCS | Performed by: INTERNAL MEDICINE

## 2021-11-03 RX ADMIN — FLUDEOXYGLUCOSE F-18 14.13 MCI.: 500 INJECTION, SOLUTION INTRAVENOUS at 09:40

## 2021-11-03 NOTE — TELEPHONE ENCOUNTER
FYI   This patient had an abnormal cologuard result. It was faxed over.      Tiffani Fabian on 11/3/2021 at 1:40 PM

## 2021-11-04 ENCOUNTER — TELEPHONE (OUTPATIENT)
Dept: INTERNAL MEDICINE | Facility: OTHER | Age: 66
End: 2021-11-04
Payer: COMMERCIAL

## 2021-11-04 DIAGNOSIS — K11.8 PAROTID MASS: Primary | ICD-10-CM

## 2021-11-04 NOTE — TELEPHONE ENCOUNTER
Please call with test results.   PET scan results.  They also have an apt. On Monday for this.  They want the results early if possible.    Tiffani Fabian on 11/4/2021 at 9:46 AM

## 2021-11-04 NOTE — TELEPHONE ENCOUNTER
Patient was called with results from his PET scan.  His lung nodule did not show any uptake.  He was noted to have a parotid mass that showed possible uptake.  Incidental adrenal myolipoma was noted.  At this time recommend possibly getting a CT of the neck prior to follow-up on Monday.  We will work on this tomorrow.  He is to keep his follow-up appointment with his primary care provider.

## 2021-11-05 NOTE — TELEPHONE ENCOUNTER
SAUD - I talked to scheduling - he will be scheduled for neck CT at 2pm on Monday before his follow up with Dr Khan.

## 2021-11-08 ENCOUNTER — OFFICE VISIT (OUTPATIENT)
Dept: INTERNAL MEDICINE | Facility: OTHER | Age: 66
End: 2021-11-08
Attending: INTERNAL MEDICINE
Payer: MEDICARE

## 2021-11-08 ENCOUNTER — HOSPITAL ENCOUNTER (OUTPATIENT)
Dept: CT IMAGING | Facility: OTHER | Age: 66
End: 2021-11-08
Attending: INTERNAL MEDICINE
Payer: MEDICARE

## 2021-11-08 VITALS
RESPIRATION RATE: 16 BRPM | SYSTOLIC BLOOD PRESSURE: 130 MMHG | WEIGHT: 181.8 LBS | OXYGEN SATURATION: 97 % | BODY MASS INDEX: 28.2 KG/M2 | DIASTOLIC BLOOD PRESSURE: 70 MMHG | TEMPERATURE: 97.8 F | HEART RATE: 88 BPM

## 2021-11-08 DIAGNOSIS — R19.5 POSITIVE COLORECTAL CANCER SCREENING USING COLOGUARD TEST: Primary | ICD-10-CM

## 2021-11-08 DIAGNOSIS — R91.8 MASS OF UPPER LOBE OF RIGHT LUNG: ICD-10-CM

## 2021-11-08 DIAGNOSIS — K11.8 PAROTID MASS: ICD-10-CM

## 2021-11-08 DIAGNOSIS — D49.0 PAROTID TUMOR: ICD-10-CM

## 2021-11-08 DIAGNOSIS — J43.8 OTHER EMPHYSEMA (H): ICD-10-CM

## 2021-11-08 DIAGNOSIS — Z72.0 TOBACCO ABUSE: ICD-10-CM

## 2021-11-08 PROCEDURE — 250N000011 HC RX IP 250 OP 636: Performed by: INTERNAL MEDICINE

## 2021-11-08 PROCEDURE — G0463 HOSPITAL OUTPT CLINIC VISIT: HCPCS

## 2021-11-08 PROCEDURE — 99214 OFFICE O/P EST MOD 30 MIN: CPT | Performed by: INTERNAL MEDICINE

## 2021-11-08 PROCEDURE — G0463 HOSPITAL OUTPT CLINIC VISIT: HCPCS | Mod: 25

## 2021-11-08 PROCEDURE — 70491 CT SOFT TISSUE NECK W/DYE: CPT

## 2021-11-08 RX ORDER — IOPAMIDOL 755 MG/ML
100 INJECTION, SOLUTION INTRAVASCULAR ONCE
Status: COMPLETED | OUTPATIENT
Start: 2021-11-08 | End: 2021-11-08

## 2021-11-08 RX ADMIN — IOPAMIDOL 100 ML: 755 INJECTION, SOLUTION INTRAVENOUS at 14:21

## 2021-11-08 ASSESSMENT — ENCOUNTER SYMPTOMS
HEMATOLOGIC/LYMPHATIC NEGATIVE: 1
CONSTITUTIONAL NEGATIVE: 1
ENDOCRINE NEGATIVE: 1
ALLERGIC/IMMUNOLOGIC NEGATIVE: 1

## 2021-11-08 ASSESSMENT — PAIN SCALES - GENERAL: PAINLEVEL: NO PAIN (0)

## 2021-11-08 NOTE — PROGRESS NOTES
Medication Reconciliation: complete   Advance Care Directive Reviewed    FOOD SECURITY SCREENING QUESTIONS  Hunger Vital Signs:  Within the past 12 months we worried whether our food would run out before we got money to buy more. Never  Within the past 12 months the food we bought just didn't last and we didn't have money to get more. Never  Usha Sanches LPN .............11/8/2021  3:39 PM

## 2021-11-08 NOTE — PROGRESS NOTES
Chief Complaint: Discuss abnormal findings.    HPI: He is in today for follow-up.  At his physical, we did a CT scan for lung cancer screening.  He has a spiculated right upper lung lesion.  I wanted to him to be seen by pulmonary, instead he was scheduled for a PET scan.  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  CT scan today confirmed the presence of this tumor.  Scan is most consistent with a benign pathology.  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 Ortiz, and at that time Dr. Ortiz commented to the patient that he could have a recurrence on the right side.    The patient was also found to have a positive Cologuard.  This was reviewed with him today, he needs colonoscopy.    The patient continues to smoke.  I reviewed with him the fact that his CT scan shows that he has got moderate emphysema as well as the lung nodule and he absolutely needs to quit smoking.  His wife is in attendance today and is in complete agreement.    Past Medical History:   Diagnosis Date     Epigastric pain     No Comments Provided     Gastroesophageal reflux disease without esophagitis      Nicotine dependence, uncomplicated     No Comments Provided     Rosacea     No Comments Provided       Past Surgical History:   Procedure Laterality Date     HERNIA REPAIR Bilateral      HERNIORRHAPHY INGUINAL Left 8/23/2019    Procedure: Left HERNIa repair , INGUINAL, with Mesh;  Surgeon: Roderick Jackson MD;  Location: [facility] OR     PAROTIDECTOMY Left 2012       No Known Allergies    Current Outpatient Medications   Medication Sig Dispense Refill     Ascorbic Acid (VITAMIN C PO) Take 1 tablet by mouth daily       ELDERBERRY PO Take 1 tablet by mouth daily       famotidine (PEPCID) 20 MG tablet Take 20 mg by mouth every other day       omeprazole (PRILOSEC) 20 MG CR capsule Take 20 mg by mouth every other day        sildenafil (VIAGRA) 100 MG tablet  Take 1 tablet (100 mg) by mouth daily as needed 10 tablet 5     VITAMIN D PO Take 1 tablet by mouth daily         Social History     Socioeconomic History     Marital status:      Spouse name: Not on file     Number of children: Not on file     Years of education: Not on file     Highest education level: Not on file   Occupational History     Not on file   Tobacco Use     Smoking status: Current Every Day Smoker     Packs/day: 0.25     Years: 40.00     Pack years: 10.00     Types: Cigarettes     Smokeless tobacco: Never Used   Vaping Use     Vaping Use: Never used   Substance and Sexual Activity     Alcohol use: No     Comment: Alcoholic Drinks/day: rare use.     Drug use: No     Sexual activity: Not Currently   Other Topics Concern     Parent/sibling w/ CABG, MI or angioplasty before 65F 55M? Not Asked   Social History Narrative     and retired from [employer].  Grown children and 2 grandchildren.  Tobacco use 1 ppd - ongoing.     Social Determinants of Health     Financial Resource Strain: Not on file   Food Insecurity: Not on file   Transportation Needs: Not on file   Physical Activity: Not on file   Stress: Not on file   Social Connections: Not on file   Intimate Partner Violence: Not on file   Housing Stability: Not on file       Review of Systems   Constitutional: Negative.    Endocrine: Negative.    Skin: Negative.    Allergic/Immunologic: Negative.    Hematological: Negative.        Physical Exam  Vitals and nursing note reviewed.   Constitutional:       General: He is not in acute distress.     Appearance: Normal appearance. He is not ill-appearing, toxic-appearing or diaphoretic.   Neurological:      Mental Status: He is alert.         Assessment:      ICD-10-CM    1. Positive colorectal cancer screening using Cologuard test  R19.5 Adult Gastro Ref - Procedure Only   2. Parotid tumor  D49.0 Otolaryngology Referral   3. Mass of upper lobe of right lung  R91.8    4. Tobacco abuse  Z72.0    5.  Other emphysema (H)  J43.8        Plan: For the positive Cologuard, colonoscopy has been ordered.    For the parotid lesion, consultation with Dr. Ortiz is requested.    For the lung nodule, long discussion regarding options including referral to pulmonary medicine versus close follow-up.  They opted for the latter understanding that this could represent a malignancy.  We will plan a CT scan of the chest again in 3 months with referral to pulmonary should there be any change.    Strongly encourage the patient to quit smoking.  His wife is going to try to be as supportive as possible to try to help him accomplish this.

## 2021-11-10 ENCOUNTER — TELEPHONE (OUTPATIENT)
Dept: INTERNAL MEDICINE | Facility: OTHER | Age: 66
End: 2021-11-10
Payer: COMMERCIAL

## 2021-11-10 NOTE — TELEPHONE ENCOUNTER
Referral was placed 2 days ago.  Due to the volume of referrals at this time, it is still being processed.  It will be sent as soon as we are able to complete it.  Brionna Paula on 11/10/2021 at 10:15 AM

## 2021-11-10 NOTE — TELEPHONE ENCOUNTER
Patient looking for a referral to Dr Ortiz.  She called them and they have not received it.  Please call      Celi Horne on 11/10/2021 at 9:55 AM

## 2021-11-16 PROBLEM — R19.5 POSITIVE COLORECTAL CANCER SCREENING USING COLOGUARD TEST: Status: ACTIVE | Noted: 2021-11-16

## 2021-11-18 ENCOUNTER — ALLIED HEALTH/NURSE VISIT (OUTPATIENT)
Dept: FAMILY MEDICINE | Facility: OTHER | Age: 66
End: 2021-11-18
Attending: FAMILY MEDICINE
Payer: MEDICARE

## 2021-11-18 DIAGNOSIS — Z20.822 EXPOSURE TO 2019 NOVEL CORONAVIRUS: Primary | ICD-10-CM

## 2021-11-18 PROCEDURE — C9803 HOPD COVID-19 SPEC COLLECT: HCPCS

## 2021-11-18 PROCEDURE — U0003 INFECTIOUS AGENT DETECTION BY NUCLEIC ACID (DNA OR RNA); SEVERE ACUTE RESPIRATORY SYNDROME CORONAVIRUS 2 (SARS-COV-2) (CORONAVIRUS DISEASE [COVID-19]), AMPLIFIED PROBE TECHNIQUE, MAKING USE OF HIGH THROUGHPUT TECHNOLOGIES AS DESCRIBED BY CMS-2020-01-R: HCPCS | Mod: ZL

## 2021-11-19 LAB — SARS-COV-2 RNA RESP QL NAA+PROBE: NEGATIVE

## 2021-11-22 ENCOUNTER — ANESTHESIA (OUTPATIENT)
Dept: SURGERY | Facility: OTHER | Age: 66
End: 2021-11-22
Payer: MEDICARE

## 2021-11-22 ENCOUNTER — HOSPITAL ENCOUNTER (OUTPATIENT)
Facility: OTHER | Age: 66
Discharge: HOME | End: 2021-11-22
Attending: SURGERY | Admitting: SURGERY
Payer: MEDICARE

## 2021-11-22 ENCOUNTER — ANESTHESIA EVENT (OUTPATIENT)
Dept: SURGERY | Facility: OTHER | Age: 66
End: 2021-11-22
Payer: MEDICARE

## 2021-11-22 VITALS
DIASTOLIC BLOOD PRESSURE: 60 MMHG | BODY MASS INDEX: 28.41 KG/M2 | RESPIRATION RATE: 20 BRPM | TEMPERATURE: 97.4 F | WEIGHT: 181 LBS | HEART RATE: 88 BPM | HEIGHT: 67 IN | SYSTOLIC BLOOD PRESSURE: 128 MMHG | OXYGEN SATURATION: 93 %

## 2021-11-22 PROBLEM — K63.5 COLON POLYPS: Status: ACTIVE | Noted: 2021-11-22

## 2021-11-22 PROCEDURE — 45385 COLONOSCOPY W/LESION REMOVAL: CPT | Performed by: SURGERY

## 2021-11-22 PROCEDURE — 250N000009 HC RX 250: Performed by: NURSE ANESTHETIST, CERTIFIED REGISTERED

## 2021-11-22 PROCEDURE — 250N000011 HC RX IP 250 OP 636: Performed by: NURSE ANESTHETIST, CERTIFIED REGISTERED

## 2021-11-22 PROCEDURE — 45380 COLONOSCOPY AND BIOPSY: CPT | Performed by: SURGERY

## 2021-11-22 PROCEDURE — 88305 TISSUE EXAM BY PATHOLOGIST: CPT

## 2021-11-22 PROCEDURE — 45384 COLONOSCOPY W/LESION REMOVAL: CPT | Mod: PT,XU | Performed by: SURGERY

## 2021-11-22 PROCEDURE — 258N000003 HC RX IP 258 OP 636: Performed by: SURGERY

## 2021-11-22 PROCEDURE — 45385 COLONOSCOPY W/LESION REMOVAL: CPT

## 2021-11-22 PROCEDURE — 45385 COLONOSCOPY W/LESION REMOVAL: CPT | Performed by: NURSE ANESTHETIST, CERTIFIED REGISTERED

## 2021-11-22 PROCEDURE — 45384 COLONOSCOPY W/LESION REMOVAL: CPT | Mod: XU | Performed by: SURGERY

## 2021-11-22 RX ORDER — PROPOFOL 10 MG/ML
INJECTION, EMULSION INTRAVENOUS PRN
Status: DISCONTINUED | OUTPATIENT
Start: 2021-11-22 | End: 2021-11-22

## 2021-11-22 RX ORDER — NALOXONE HYDROCHLORIDE 0.4 MG/ML
0.4 INJECTION, SOLUTION INTRAMUSCULAR; INTRAVENOUS; SUBCUTANEOUS
Status: DISCONTINUED | OUTPATIENT
Start: 2021-11-22 | End: 2021-11-22 | Stop reason: HOSPADM

## 2021-11-22 RX ORDER — LIDOCAINE 40 MG/G
CREAM TOPICAL
Status: DISCONTINUED | OUTPATIENT
Start: 2021-11-22 | End: 2021-11-22 | Stop reason: HOSPADM

## 2021-11-22 RX ORDER — SODIUM CHLORIDE, SODIUM LACTATE, POTASSIUM CHLORIDE, CALCIUM CHLORIDE 600; 310; 30; 20 MG/100ML; MG/100ML; MG/100ML; MG/100ML
INJECTION, SOLUTION INTRAVENOUS CONTINUOUS
Status: DISCONTINUED | OUTPATIENT
Start: 2021-11-22 | End: 2021-11-22 | Stop reason: HOSPADM

## 2021-11-22 RX ORDER — NALOXONE HYDROCHLORIDE 0.4 MG/ML
0.2 INJECTION, SOLUTION INTRAMUSCULAR; INTRAVENOUS; SUBCUTANEOUS
Status: DISCONTINUED | OUTPATIENT
Start: 2021-11-22 | End: 2021-11-22 | Stop reason: HOSPADM

## 2021-11-22 RX ORDER — LIDOCAINE HYDROCHLORIDE 20 MG/ML
INJECTION, SOLUTION INFILTRATION; PERINEURAL PRN
Status: DISCONTINUED | OUTPATIENT
Start: 2021-11-22 | End: 2021-11-22

## 2021-11-22 RX ORDER — FLUMAZENIL 0.1 MG/ML
0.2 INJECTION, SOLUTION INTRAVENOUS
Status: DISCONTINUED | OUTPATIENT
Start: 2021-11-22 | End: 2021-11-22 | Stop reason: HOSPADM

## 2021-11-22 RX ORDER — PROPOFOL 10 MG/ML
INJECTION, EMULSION INTRAVENOUS CONTINUOUS PRN
Status: DISCONTINUED | OUTPATIENT
Start: 2021-11-22 | End: 2021-11-22

## 2021-11-22 RX ORDER — ONDANSETRON 2 MG/ML
4 INJECTION INTRAMUSCULAR; INTRAVENOUS
Status: DISCONTINUED | OUTPATIENT
Start: 2021-11-22 | End: 2021-11-22 | Stop reason: HOSPADM

## 2021-11-22 RX ADMIN — SODIUM CHLORIDE, POTASSIUM CHLORIDE, SODIUM LACTATE AND CALCIUM CHLORIDE: 600; 310; 30; 20 INJECTION, SOLUTION INTRAVENOUS at 09:03

## 2021-11-22 RX ADMIN — LIDOCAINE HYDROCHLORIDE 40 MG: 20 INJECTION, SOLUTION INFILTRATION; PERINEURAL at 10:23

## 2021-11-22 RX ADMIN — PROPOFOL 200 MCG/KG/MIN: 10 INJECTION, EMULSION INTRAVENOUS at 10:23

## 2021-11-22 RX ADMIN — PROPOFOL 100 MG: 10 INJECTION, EMULSION INTRAVENOUS at 10:23

## 2021-11-22 ASSESSMENT — LIFESTYLE VARIABLES: TOBACCO_USE: 1

## 2021-11-22 ASSESSMENT — MIFFLIN-ST. JEOR: SCORE: 1559.64

## 2021-11-22 ASSESSMENT — COPD QUESTIONNAIRES: COPD: 1

## 2021-11-22 NOTE — DISCHARGE INSTRUCTIONS
[facility]  Adult Discharge Orders & Instructions    ________________________________________________________________          For 12 hours after surgery  1. Get plenty of rest.  A responsible adult must stay with you for at least 12 hours after you leave the hospital.   2. You may feel lightheaded.  IF so, sit for a few minutes before standing.  Have someone help you get up.   3. You may have a slight fever. Call the doctor if your fever is over 101 F (38.3 C) (taken under the tongue) or lasts longer than 24 hours.  4. You may have a dry mouth, a sore throat, muscle aches or trouble sleeping.  These should go away after 24 hours.  5. Do not make important or legal decisions.  6.   Do not drive or use heavy equipment.  If you have weakness or tingling, don't drive or use heavy equipment until this feeling goes away.    To contact a doctor, call   [phone]_______________________

## 2021-11-22 NOTE — ANESTHESIA PREPROCEDURE EVALUATION
Anesthesia Pre-Procedure Evaluation    Patient: Martin Bryant   MRN: [id number] : 1955        Preoperative Diagnosis: Positive colorectal cancer screening using Cologuard test [R19.5]    Procedure : Procedure(s):  COLONOSCOPY          Past Medical History:   Diagnosis Date     Epigastric pain     No Comments Provided     Gastroesophageal reflux disease without esophagitis      Nicotine dependence, uncomplicated     No Comments Provided     Rosacea     No Comments Provided      Past Surgical History:   Procedure Laterality Date     HERNIA REPAIR Bilateral      HERNIORRHAPHY INGUINAL Left 2019    Procedure: Left HERNIa repair , INGUINAL, with Mesh;  Surgeon: Roderick Jackson MD;  Location: [facility] OR     PAROTIDECTOMY Left       No Known Allergies   Social History     Tobacco Use     Smoking status: Current Every Day Smoker     Packs/day: 0.25     Years: 40.00     Pack years: 10.00     Types: Cigarettes     Smokeless tobacco: Never Used   Substance Use Topics     Alcohol use: No     Comment: Alcoholic Drinks/day: rare use.      Wt Readings from Last 1 Encounters:   21 82.1 kg (181 lb)        Anesthesia Evaluation            ROS/MED HX  ENT/Pulmonary: Comment: Quit smoking and chewing tobacco 2 weeks ago    (+) tobacco use, Past use, COPD,     Neurologic:  - neg neurologic ROS     Cardiovascular:       METS/Exercise Tolerance: >4 METS    Hematologic:  - neg hematologic  ROS     Musculoskeletal:  - neg musculoskeletal ROS     GI/Hepatic: Comment: Epigastric pain    (+) GERD,     Renal/Genitourinary:     (+) renal disease,     Endo:  - neg endo ROS     Psychiatric/Substance Use:  - neg psychiatric ROS     Infectious Disease:  - neg infectious disease ROS     Malignancy:  - neg malignancy ROS     Other:  - neg other ROS          Physical Exam    Airway        Mallampati: II   TM distance: > 3 FB   Neck ROM: full   Mouth opening: > 3 cm    Respiratory Devices and Support         Dental  no  notable dental history         Cardiovascular   cardiovascular exam normal       Rhythm and rate: regular and normal     Pulmonary   pulmonary exam normal        breath sounds clear to auscultation           OUTSIDE LABS:  CBC:   Lab Results   Component Value Date    WBC 14.7 (H) 05/11/2018    WBC 8.1 04/23/2018    HGB 15.6 05/11/2018    HGB 16.6 04/23/2018    HCT 45.6 05/11/2018    HCT 48.2 04/23/2018     05/11/2018     04/23/2018     BMP:   Lab Results   Component Value Date     10/11/2021     05/11/2018    POTASSIUM 4.4 10/11/2021    POTASSIUM 4.9 05/11/2018    CHLORIDE 104 10/11/2021    CHLORIDE 100 05/11/2018    CO2 29 10/11/2021    CO2 25 05/11/2018    BUN 18 10/11/2021    BUN 22 05/11/2018    CR 0.89 10/11/2021    CR 1.26 05/11/2018     10/11/2021     (H) 05/11/2018     COAGS: No results found for: PTT, INR, FIBR  POC: No results found for: BGM, HCG, HCGS  HEPATIC:   Lab Results   Component Value Date    ALBUMIN 4.2 10/11/2021    PROTTOTAL 7.0 10/11/2021    ALT 14 10/11/2021    AST 14 10/11/2021    ALKPHOS 72 10/11/2021    BILITOTAL 0.6 10/11/2021     OTHER:   Lab Results   Component Value Date    LACT 1.5 05/11/2018    SUREKHA 9.5 10/11/2021    LIPASE 3 (L) 05/11/2018    CRP 0.0 05/11/2018       Anesthesia Plan    ASA Status:  2   NPO Status:  NPO Appropriate    Anesthesia Type: MAC.     - Reason for MAC: straight local not clinically adequate              Consents    Anesthesia Plan(s) and associated risks, benefits, and realistic alternatives discussed. Questions answered and patient/representative(s) expressed understanding.    - Discussed:     - Discussed with:  Patient         Postoperative Care            Comments:                David Kellerman, APRN CRNA

## 2021-11-22 NOTE — H&P
"History and Physical    CHIEF COMPLAINT / REASON FOR PROCEDURE:  Positive Cologuard    PERTINENT HISTORY   Patient is a 66 year old male who presents today for colonoscopy for positive Cologuard.   Patient has no complaints.    Past Medical History:   Diagnosis Date     Epigastric pain     No Comments Provided     Gastroesophageal reflux disease without esophagitis      Nicotine dependence, uncomplicated     No Comments Provided     Rosacea     No Comments Provided     Past Surgical History:   Procedure Laterality Date     HERNIA REPAIR Bilateral      HERNIORRHAPHY INGUINAL Left 8/23/2019    Procedure: Left HERNIa repair , INGUINAL, with Mesh;  Surgeon: Roderick Jackson MD;  Location: [facility] OR     PAROTIDECTOMY Left 2012       Bleeding tendencies:  No    ALLERGIES/SENSITIVITIES: No Known Allergies     CURRENT MEDICATIONS:    Prior to Admission medications    Medication Sig Start Date End Date Taking? Authorizing Provider   Ascorbic Acid (VITAMIN C PO) Take 1 tablet by mouth daily   Yes Reported, Patient   bisacodyl (DULCOLAX) 5 MG EC tablet Use as directed per colonoscopy prep. 11/16/21  Yes Isaac Puente MD   ELDERBERRY PO Take 1 tablet by mouth daily   Yes Reported, Patient   famotidine (PEPCID) 20 MG tablet Take 20 mg by mouth every other day   Yes Reported, Patient   omeprazole (PRILOSEC) 20 MG CR capsule Take 20 mg by mouth every other day  8/30/12  Yes Reported, Patient   sildenafil (VIAGRA) 100 MG tablet Take 1 tablet (100 mg) by mouth daily as needed 10/11/21  Yes Obi Khan MD   VITAMIN D PO Take 1 tablet by mouth daily   Yes Reported, Patient       Physical Exam:  /78 (Cuff Size: Adult Regular)   Pulse 80   Temp 97.6  F (36.4  C) (Tympanic)   Resp 20   Ht 1.702 m (5' 7\")   Wt 82.1 kg (181 lb)   SpO2 99%   BMI 28.35 kg/m    EXAM:  Chest/Respiratory Exam: Normal - Clear to auscultation without rales, rhonchi, or wheezing.  Cardiovascular Exam: normal, regular rate and rhythm      PLAN: " COLONOSCOPY .  Patient understands risks of bleeding, perforation, potential inability to reach cecum, aspiration and wishes to proceed.

## 2021-11-22 NOTE — ANESTHESIA CARE TRANSFER NOTE
Patient: Martin Bryant    Procedure: Procedure(s):  COLONOSCOPY, WITH POLYPECTOMY & Endoscopic Tattooing       Diagnosis: Positive colorectal cancer screening using Cologuard test [R19.5]  Diagnosis Additional Information: No value filed.    Anesthesia Type:   MAC     Note:    Oropharynx: oropharynx clear of all foreign objects  Level of Consciousness: awake  Oxygen Supplementation: room air    Independent Airway: airway patency satisfactory and stable  Dentition: dentition unchanged  Vital Signs Stable: post-procedure vital signs reviewed and stable  Report to RN Given: handoff report given  Patient transferred to: Phase II    Handoff Report: Identifed the Patient, Identified the Reponsible Provider, Reviewed the pertinent medical history, Discussed the surgical course, Reviewed Intra-OP anesthesia mangement and issues during anesthesia, Set expectations for post-procedure period and Allowed opportunity for questions and acknowledgement of understanding      Vitals:  Vitals Value Taken Time   BP     Temp     Pulse     Resp     SpO2         Electronically Signed By: EDGARD Cabezas CRNA  November 22, 2021  11:00 AM

## 2021-11-22 NOTE — ANESTHESIA POSTPROCEDURE EVALUATION
Patient: Martin Bryant    Procedure: Procedure(s):  COLONOSCOPY, WITH POLYPECTOMY & Endoscopic Tattooing       Diagnosis:Positive colorectal cancer screening using Cologuard test [R19.5]  Diagnosis Additional Information: No value filed.    Anesthesia Type:  MAC    Note:  Disposition: Outpatient   Postop Pain Control:            Sign Out: Well controlled pain   PONV: No   Neuro/Psych:             Sign Out: Acceptable/Baseline neuro status   Airway/Respiratory:             Sign Out: Acceptable/Baseline resp. status   CV/Hemodynamics:             Sign Out: Acceptable CV status   Other NRE: NONE   DID A NON-ROUTINE EVENT OCCUR? No           Last vitals:  Vitals Value Taken Time   /60 11/22/21 1115   Temp 97.4  F (36.3  C) 11/22/21 1100   Pulse 88 11/22/21 1115   Resp 20 11/22/21 1115   SpO2 93 % 11/22/21 1115       Electronically Signed By: David Kellerman, APRN CRNA  November 22, 2021  12:04 PM

## 2021-11-22 NOTE — OP NOTE
PROCEDURE NOTE    DATE OF SERVICE: 11/22/2021    SURGEON: Isaac Puente MD    PRE-OP DIAGNOSIS:      Positive FIT      POST-OP DIAGNOSIS:  Same    Polyps at mid TC, 20 cm, 15 cm, 10 cm, and rectum    PROCEDURE:     Colonoscopy with snare polypectomies and hot biopsies    ANESTHESIA:  MECHE Dominguez CRNA    INDICATION FOR THE PROCEDURE: The patient is a 66 year old male with positive Cologuard . The patient has no other complaints  . After explaining the risks to include bleeding, perforation, potential inability toreach the cecum, the patient wished to proceed.    PROCEDURE:After adequate sedation, the patient was in the left lateral decubitus position.  Rectal exam was performed.  There was normal tone and no palpable masses .  The colonoscope was introduced into the rectum and advanced to the cecum with Mild difficulty.  The patient's prep was excellent.  The terminal cecum was reached.  The cecum, ascending, transverse, descending and sigmoid colon was with a flat 0.5 cm polyp in mid TC that was hot biopsied and destroyed. At 20 cm, a small raised polyp about 0.5 cm was completely removed by snare. At 15 cm  And 10 cm small under 0.5 cm polyps were hot biopsied and destroyed. At 15 cm a 2 cm pedunculated polyp was completely removed by snare and marked with ink. At 10 cm a raised 0.7 cm polyp was completely removed by snare. .  The scope was retroflexed in the rectum.  The rectum was remarkable for a diminutive polyp that was hot biopsied and destroyed .  The scope was straightened and removed.  The patient tolerated the procedure well.     ESTIMATED BLOOD LOSS: none    COMPLICATIONS:  None    TISSUE REMOVED:  Yes    RECOMMEND:        Follow-up pending pathology      Isaac Puente MD FACS

## 2021-11-23 ENCOUNTER — OFFICE VISIT (OUTPATIENT)
Dept: OTOLARYNGOLOGY | Facility: OTHER | Age: 66
End: 2021-11-23
Attending: OTOLARYNGOLOGY
Payer: MEDICARE

## 2021-11-23 DIAGNOSIS — K11.9 LESION OF PAROTID GLAND: Primary | ICD-10-CM

## 2021-11-23 LAB
PATH REPORT.COMMENTS IMP SPEC: NORMAL
PATH REPORT.FINAL DX SPEC: NORMAL
PHOTO IMAGE: NORMAL

## 2021-11-23 PROCEDURE — G0463 HOSPITAL OUTPT CLINIC VISIT: HCPCS

## 2021-11-23 NOTE — NURSING NOTE
Patient here to see ENT for parotid gland, images @  & [facility] sent.   Sade Bocanegra LPN ..........11/23/2021 9:21 AM

## 2021-11-24 PROBLEM — Z86.0101 H/O ADENOMATOUS POLYP OF COLON: Status: ACTIVE | Noted: 2021-11-22

## 2021-11-24 PROBLEM — R19.5 POSITIVE COLORECTAL CANCER SCREENING USING COLOGUARD TEST: Status: RESOLVED | Noted: 2021-11-16 | Resolved: 2021-11-24

## 2022-01-07 ENCOUNTER — TELEPHONE (OUTPATIENT)
Dept: INTERNAL MEDICINE | Facility: OTHER | Age: 67
End: 2022-01-07
Payer: COMMERCIAL

## 2022-01-07 DIAGNOSIS — R91.8 MASS OF UPPER LOBE OF RIGHT LUNG: Primary | ICD-10-CM

## 2022-01-07 NOTE — TELEPHONE ENCOUNTER
Patient had a CT chest in October and was told to follow up on this in three months:    Impression:   Lung-RADS Category 4A. Suspicious. 3 month LDCT; PET/CT may be used  when there is a ? 8 mm (?268.1 mm3) solid component.     Recommend PET/CT.         Deanne Duran CMA([facility])..................1/7/2022   12:03 PM

## 2022-01-10 NOTE — TELEPHONE ENCOUNTER
Order pended with expected date of 2/10/22.  Deanne Duran CMA([facility])..................1/10/2022   9:15 AM

## 2022-01-16 ENCOUNTER — ALLIED HEALTH/NURSE VISIT (OUTPATIENT)
Dept: FAMILY MEDICINE | Facility: OTHER | Age: 67
End: 2022-01-16
Payer: MEDICARE

## 2022-01-16 DIAGNOSIS — R09.81 CONGESTION OF NASAL SINUS: Primary | ICD-10-CM

## 2022-01-16 DIAGNOSIS — R05.9 COUGH: ICD-10-CM

## 2022-01-16 DIAGNOSIS — J02.9 SORE THROAT: ICD-10-CM

## 2022-01-16 PROCEDURE — C9803 HOPD COVID-19 SPEC COLLECT: HCPCS

## 2022-01-16 PROCEDURE — U0005 INFEC AGEN DETEC AMPLI PROBE: HCPCS | Mod: ZL

## 2022-01-16 NOTE — PROGRESS NOTES
Patient here for a Covid Testing. Congestion, cough and sore throat.    Lien Simon MA on 1/16/2022 at 1:45 PM

## 2022-01-18 LAB — SARS-COV-2 RNA RESP QL NAA+PROBE: DETECTED

## 2022-01-20 ENCOUNTER — TELEPHONE (OUTPATIENT)
Dept: FAMILY MEDICINE | Facility: OTHER | Age: 67
End: 2022-01-20
Payer: COMMERCIAL

## 2022-01-20 ENCOUNTER — HOSPITAL ENCOUNTER (OUTPATIENT)
Dept: CT IMAGING | Facility: OTHER | Age: 67
Discharge: HOME | End: 2022-01-20
Attending: INTERNAL MEDICINE | Admitting: INTERNAL MEDICINE
Payer: MEDICARE

## 2022-01-20 DIAGNOSIS — R91.8 MASS OF UPPER LOBE OF RIGHT LUNG: ICD-10-CM

## 2022-01-20 PROCEDURE — 71250 CT THORAX DX C-: CPT

## 2022-01-20 NOTE — TELEPHONE ENCOUNTER
"Coronavirus (COVID-19) Notification    Caller Name (Patient, parent, daughter/son, grandparent, etc)  Patient    Reason for call  Notify of Positive Coronavirus (COVID-19) lab results, assess symptoms,  review  [employer] [facility] recommendations    Lab Result    Lab test:  2019-nCoV rRt-PCR or SARS-CoV-2 PCR    Oropharyngeal AND/OR nasopharyngeal swabs is POSITIVE for 2019-nCoV RNA/SARS-COV-2 PCR (COVID-19 virus)    RN Recommendations/Instructions per [facility] Coronavirus COVID-19 recommendations    Brief introduction script  Introduce self then review script:  \"I am calling on behalf of [employer].  We were notified that your Coronavirus test (COVID-19) for was POSITIVE for the virus.  I have some information to relay to you but first I wanted to mention that the [place] Dept of Health will be contacting you shortly [it's possible MD already called Patient] to talk to you more about how you are feeling and other people you have had contact with who might now also have the virus.  Also,  [employer] [facility] is Partnering with the [facility] for Covid-19 research, you may be contacted directly by research staff.\"      Assessment (Inquire about Patient's current symptoms)   Assessment   Current Symptoms at time of phone call: (if no symptoms, document No symptoms] No Sx   Symptoms onset (if applicable) 1/14/22     If at time of call, Patients symptoms hare worsened, the Patient should contact 911 or have someone drive them to Emergency Dept promptly:      If Patient calling 911, inform 911 personal that you have tested positive for the Coronavirus (COVID-19).  Place mask on and await 911 to arrive.    If Emergency Dept, If possible, please have another adult drive you to the Emergency Dept but you need to wear mask when in contact with other people.          Treatment Options:   Patient classified as COVID treatment eligible by Epic high risk algorithm: Yes  Is the patient symptomatic at the time of result " notification? No.      Review information with Patient    Your result was positive. This means you have COVID-19 (coronavirus).  We have sent you a letter that reviews the information that I'll be reviewing with you now.    How can I protect others?    If you have symptoms: stay home and away from others (self-isolate) until:    You've had no fever--and no medicine that reduces fever--for 1 full day (24 hours). And       Your other symptoms have gotten better. For example, your cough or breathing has improved. And     At least 10 days have passed since your symptoms started. (If you've been told by a doctor that you have a weak immune system, wait 20 days.)     If you don't have symptoms: Stay home and away from others (self-isolate) until at least 10 days have passed since your first positive COVID-19 test. (Date test collected)    During this time:    Stay in your own room, including for meals. Use your own bathroom if you can.    Stay away from others in your home. No hugging, kissing or shaking hands. No visitors.     Don't go to work, school or anywhere else.     Clean  high touch  surfaces often (doorknobs, counters, handles, etc.). Use a household cleaning spray or wipes. You'll find a full list on the EPA website at www.epa.gov/pesticide-registration/list-n-disinfectants-use-against-sars-cov-2.     Cover your mouth and nose with a mask, tissue or other face covering to avoid spreading germs.    Wash your hands and face often with soap and water.    Make a list of people you have been in close contact with recently, even if either of you wore a face covering.   - Start your list from 2 days before you became ill or had a positive test.  - Include anyone that was within 6 feet of you for a cumulative total of 15 minutes or more in 24 hours. (Example: if you sat next to Isaac for 5 minutes in the morning and 10 minutes in the afternoon, then you were in close contact for 15 minutes total that day. Isaac would be  added to your list.)    A public health worker will call or text you. It is important that you answer. They will ask you questions about possible exposures to COVID-19, such as people you have been in direct contact with and places you have visited.    Tell the people on your list that you have COVID-19; they should stay away from others for 14 days starting from the last time they were in contact with you (unless you are told something different from a public health worker).     Caregivers in these groups are at risk for severe illness due to COVID-19:  o People 65 years and older  o People who live in a nursing home or long-term care facility  o People with chronic disease (lung, heart, cancer, diabetes, kidney, liver, immunologic)  o People who have a weakened immune system, including those who:  - Are in cancer treatment  - Take medicine that weakens the immune system, such as corticosteroids  - Had a bone marrow or organ transplant  - Have an immune deficiency  - Have poorly controlled HIV or AIDS  - Are obese (body mass index of 40 or higher)  - Smoke regularly    Caregivers should wear gloves while washing dishes, handling laundry and cleaning bedrooms and bathrooms.    Wash and dry laundry with special caution. Don't shake dirty laundry, and use the warmest water setting you can.    If you have a weakened immune system, ask your doctor about other actions you should take.    For more tips, go to www.cdc.gov/coronavirus/2019-ncov/downloads/10Things.pdf.    You should not go back to work until you meet the guidelines above for ending your home isolation. You don't need to be retested for COVID-19 before going back to work--studies show that you won't spread the virus if it's been at least 10 days since your symptoms started (or 20 days, if you have a weak immune system).    Employers: This document serves as formal notice of your employee's medical guidelines for going back to work. They must meet the above  guidelines before going back to work in person.    How can I take care of myself?    1. Get lots of rest. Drink extra fluids (unless a doctor has told you not to).    2. Take Tylenol (acetaminophen) for fever or pain. If you have liver or kidney problems, ask your family doctor if it's okay to take Tylenol.     Take either:     650 mg (two 325 mg pills) every 4 to 6 hours, or     1,000 mg (two 500 mg pills) every 8 hours as needed.     Note: Don't take more than 3,000 mg in one day. Acetaminophen is found in many medicines (both prescribed and over-the-counter medicines). Read all labels to be sure you don't take too much.    For children, check the Tylenol bottle for the right dose (based on their age or weight).    3. If you have other health problems (like cancer, heart failure, an organ transplant or severe kidney disease): Call your specialty clinic if you don't feel better in the next 2 days.    4. Know when to call 911: Emergency warning signs include:    Trouble breathing or shortness of breath    Pain or pressure in the chest that doesn't go away    Feeling confused like you haven't felt before, or not being able to wake up    Bluish-colored lips or face    5. Sign up for [employer]. We know it's scary to hear that you have COVID-19. We want to track your symptoms to make sure you're okay over the next 2 weeks. Please look for an email from [employer]--this is a free, online program that we'll use to keep in touch. To sign up, follow the link in the email. Learn more at [email or web address].      Where can I get more information?    [facility]: [email or web address]    Coronavirus Basics: www.health.[facility].[place]/diseases/coronavirus/basics.html    What to Do If You're Sick: www.cdc.gov/coronavirus/2019-ncov/about/steps-when-sick.html    Ending Home Isolation: www.cdc.gov/coronavirus/2019-ncov/hcp/disposition-in-home-patients.html     Caring for Someone with COVID-19:  www.cdc.gov/coronavirus/2019-ncov/if-you-are-sick/care-for-someone.html     [facility] clinical trials (COVID-19 research studies): clinicalaffairs.[facility]-clinical-trials     A Positive COVID-19 letter will be sent via [employer] or the mail. (Exception, no letters sent to Presurgerical/Preprocedure Patients)    Susy Mckinnon LPN

## 2022-01-20 NOTE — TELEPHONE ENCOUNTER
Patient identified as eligible for COVID treatment? Yes    Coronavirus (COVID-19) Notification    Reason for call  Notify of POSITIVE COVID-19 lab result, assess symptoms,  review [facility] recommendations    Lab Result   Lab test for 2019-nCoV rRt-PCR or SARS-COV-2 PCR  Oropharyngeal AND/OR nasopharyngeal swabs were POSITIVE for 2019-nCoV RNA [OR] SARS-COV-2 RNA (COVID-19) RNA     We have been unable to reach patient by phone at this time to notify of their Positive COVID-19 result.    Left voicemail message requesting a call back to [phone] [facility] for results.        A Positive COVID-19 letter will be sent via [employer] or the mail. (Exception, no letters sent to Presurgerical/Preprocedure Patients)    Loulou Pham LPN

## 2022-02-26 NOTE — NURSING NOTE
Patient Information     Patient Name MRN Sex Martin Burgess [id number] Male 1955      Nursing Note by Gosselin, Norma J at 2017 10:15 AM     Author:  Gosselin, Norma J Service:  (none) Author Type:  (none)     Filed:  2017  9:58 AM Encounter Date:  2017 Status:  Signed     :  Gosselin, Norma J            Follow up right tib/fib [id number]  Norma J Gosselin LPN....................  2017   9:57 AM            
supervision

## 2022-05-04 ENCOUNTER — TELEPHONE (OUTPATIENT)
Dept: INTERNAL MEDICINE | Facility: OTHER | Age: 67
End: 2022-05-04
Payer: COMMERCIAL

## 2022-05-04 NOTE — TELEPHONE ENCOUNTER
I am happy to send him to a specialist if that is what he would like.  Otherwise he could certainly schedule a follow-up visit with me.

## 2022-05-04 NOTE — TELEPHONE ENCOUNTER
Patient diagnosed with a lung condition  Nov. 8 and would like to know if MAF wants to see him again or will he give him a referral for a specialist.    Cierra Sanchez on 5/4/2022 at 8:12 AM

## 2022-05-09 ENCOUNTER — OFFICE VISIT (OUTPATIENT)
Dept: INTERNAL MEDICINE | Facility: OTHER | Age: 67
End: 2022-05-09
Attending: INTERNAL MEDICINE
Payer: COMMERCIAL

## 2022-05-09 VITALS
TEMPERATURE: 96.9 F | SYSTOLIC BLOOD PRESSURE: 126 MMHG | BODY MASS INDEX: 30.23 KG/M2 | HEART RATE: 82 BPM | OXYGEN SATURATION: 97 % | WEIGHT: 193 LBS | RESPIRATION RATE: 18 BRPM | DIASTOLIC BLOOD PRESSURE: 84 MMHG

## 2022-05-09 DIAGNOSIS — J43.8 OTHER EMPHYSEMA (H): Primary | ICD-10-CM

## 2022-05-09 PROBLEM — I25.10 CORONARY ARTERY CALCIFICATION SEEN ON CAT SCAN: Status: ACTIVE | Noted: 2022-05-09

## 2022-05-09 PROBLEM — D11.9 WARTHIN'S TUMOR: Status: ACTIVE | Noted: 2022-05-09

## 2022-05-09 PROCEDURE — G0463 HOSPITAL OUTPT CLINIC VISIT: HCPCS

## 2022-05-09 PROCEDURE — 99213 OFFICE O/P EST LOW 20 MIN: CPT | Performed by: INTERNAL MEDICINE

## 2022-05-09 ASSESSMENT — ENCOUNTER SYMPTOMS
HEMATOLOGIC/LYMPHATIC NEGATIVE: 1
ALLERGIC/IMMUNOLOGIC NEGATIVE: 1
CONSTITUTIONAL NEGATIVE: 1
ENDOCRINE NEGATIVE: 1

## 2022-05-09 ASSESSMENT — PAIN SCALES - GENERAL: PAINLEVEL: NO PAIN (0)

## 2022-05-09 NOTE — NURSING NOTE
"Chief Complaint   Patient presents with     Follow Up       Initial /84   Pulse 82   Temp 96.9  F (36.1  C) (Tympanic)   Resp 18   Wt 87.5 kg (193 lb)   SpO2 97%   BMI 30.23 kg/m   Estimated body mass index is 30.23 kg/m  as calculated from the following:    Height as of 11/22/21: 1.702 m (5' 7\").    Weight as of this encounter: 87.5 kg (193 lb).  FOOD SECURITY SCREENING QUESTIONS  Hunger Vital Signs:  Within the past 12 months we worried whether our food would run out before we got money to buy more. Never  Within the past 12 months the food we bought just didn't last and we didn't have money to get more. Never        Praveen Bonilla, LPN    "

## 2022-05-09 NOTE — PROGRESS NOTES
Chief Complaint:  Follow up.    HPI: He is here for follow-up.  He had a physical and we found him to have positive Cologuard.  Subsequent colonoscopy showed polyps that were resected, he is scheduled to have a 3-year follow-up.    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    His lung CT was quite worrisome for a spiculated mass but this was negative on PET/CT.  He had a follow-up lung CT in January that was unchanged.  He was also found to have moderate emphysema.  He has quit smoking.  He is in today to talk about the emphysema.    Past Medical History:   Diagnosis Date     Coronary artery calcification seen on CAT scan      Epigastric pain     No Comments Provided     Gastroesophageal reflux disease without esophagitis      History of colonic polyps      Nicotine dependence, uncomplicated     No Comments Provided     Other emphysema (H)      Pulmonary nodules      Rosacea     No Comments Provided     Warthin's tumor     Parotid       Past Surgical History:   Procedure Laterality Date     COLONOSCOPY N/A 11/22/2021    Advanced tubular adenoma, F/U 3 years     HERNIA REPAIR Bilateral      HERNIORRHAPHY INGUINAL Left 08/23/2019    Procedure: Left HERNIa repair , INGUINAL, with Mesh;  Surgeon: Roderick Jackson MD;  Location: [facility] OR     PAROTIDECTOMY Left 2012       No Known Allergies    Current Outpatient Medications   Medication Sig Dispense Refill     Ascorbic Acid (VITAMIN C PO) Take 1 tablet by mouth daily       bisacodyl (DULCOLAX) 5 MG EC tablet Use as directed per colonoscopy prep. 2 tablet 0     ELDERBERRY PO Take 1 tablet by mouth daily       famotidine (PEPCID) 20 MG tablet Take 20 mg by mouth every other day       omeprazole (PRILOSEC) 20 MG CR capsule Take 20 mg by mouth every other day        sildenafil (VIAGRA) 100 MG tablet Take 1 tablet (100 mg) by mouth daily as needed 10  tablet 5     VITAMIN D PO Take 1 tablet by mouth daily         Social History     Socioeconomic History     Marital status:      Spouse name: Not on file     Number of children: Not on file     Years of education: Not on file     Highest education level: Not on file   Occupational History     Not on file   Tobacco Use     Smoking status: Former Smoker     Packs/day: 0.25     Years: 40.00     Pack years: 10.00     Types: Cigarettes     Quit date: 2021     Years since quittin.4     Smokeless tobacco: Never Used   Vaping Use     Vaping Use: Never used   Substance and Sexual Activity     Alcohol use: No     Comment: Alcoholic Drinks/day: rare use.     Drug use: No     Sexual activity: Not Currently   Other Topics Concern     Parent/sibling w/ CABG, MI or angioplasty before 65F 55M? Not Asked   Social History Narrative     and retired from [employer].  Grown children and 2 grandchildren.  Tobacco use 1 ppd - ongoing.     Social Determinants of Health     Financial Resource Strain: Not on file   Food Insecurity: Not on file   Transportation Needs: Not on file   Physical Activity: Not on file   Stress: Not on file   Social Connections: Not on file   Intimate Partner Violence: Not on file   Housing Stability: Not on file       Review of Systems   Constitutional: Negative.    Endocrine: Negative.    Skin: Negative.    Allergic/Immunologic: Negative.    Hematological: Negative.        Physical Exam  Vitals and nursing note reviewed.   Constitutional:       General: He is not in acute distress.     Appearance: Normal appearance. He is not ill-appearing, toxic-appearing or diaphoretic.   Neurological:      Mental Status: He is alert.         Assessment:      ICD-10-CM    1. Other emphysema (H)  J43.8 Pulmonary Function Test       Plan: Encouraged him to stay off cigarettes as he is and he was congratulated for quitting.  Immunizations are reviewed, he will get his second COVID booster.  We will get a  spirometry to further assess.  His lungs are clear today so inhaler is not necessarily indicated as he is otherwise asymptomatic.  I recommend a follow-up CT scan of the pulmonary mass at 6 months which would be at the end of July.

## 2022-05-28 ENCOUNTER — ALLIED HEALTH/NURSE VISIT (OUTPATIENT)
Dept: FAMILY MEDICINE | Facility: OTHER | Age: 67
End: 2022-05-28
Attending: INTERNAL MEDICINE
Payer: MEDICARE

## 2022-05-28 DIAGNOSIS — Z29.9 PROPHYLACTIC MEASURE: Primary | ICD-10-CM

## 2022-05-28 PROCEDURE — C9803 HOPD COVID-19 SPEC COLLECT: HCPCS

## 2022-05-28 PROCEDURE — U0003 INFECTIOUS AGENT DETECTION BY NUCLEIC ACID (DNA OR RNA); SEVERE ACUTE RESPIRATORY SYNDROME CORONAVIRUS 2 (SARS-COV-2) (CORONAVIRUS DISEASE [COVID-19]), AMPLIFIED PROBE TECHNIQUE, MAKING USE OF HIGH THROUGHPUT TECHNOLOGIES AS DESCRIBED BY CMS-2020-01-R: HCPCS | Mod: ZL

## 2022-05-29 LAB — SARS-COV-2 RNA RESP QL NAA+PROBE: NEGATIVE

## 2022-06-01 ENCOUNTER — HOSPITAL ENCOUNTER (OUTPATIENT)
Dept: RESPIRATORY THERAPY | Facility: OTHER | Age: 67
Discharge: HOME | End: 2022-06-01
Attending: INTERNAL MEDICINE | Admitting: INTERNAL MEDICINE
Payer: MEDICARE

## 2022-06-01 DIAGNOSIS — J43.8 OTHER EMPHYSEMA (H): ICD-10-CM

## 2022-06-01 PROCEDURE — 94200 LUNG FUNCTION TEST (MBC/MVV): CPT

## 2022-06-01 PROCEDURE — 94729 DIFFUSING CAPACITY: CPT

## 2022-06-01 PROCEDURE — 94729 DIFFUSING CAPACITY: CPT | Mod: 26 | Performed by: INTERNAL MEDICINE

## 2022-06-01 PROCEDURE — 94726 PLETHYSMOGRAPHY LUNG VOLUMES: CPT

## 2022-06-01 PROCEDURE — 94010 BREATHING CAPACITY TEST: CPT

## 2022-06-01 PROCEDURE — 94726 PLETHYSMOGRAPHY LUNG VOLUMES: CPT | Mod: 26 | Performed by: INTERNAL MEDICINE

## 2022-06-01 PROCEDURE — 94010 BREATHING CAPACITY TEST: CPT | Mod: 26 | Performed by: INTERNAL MEDICINE

## 2022-06-01 PROCEDURE — 999N000105 HC STATISTIC NO DOCUMENTATION TO SUPPORT CHARGE

## 2022-06-29 ENCOUNTER — APPOINTMENT (OUTPATIENT)
Dept: CT IMAGING | Facility: OTHER | Age: 67
End: 2022-06-29
Attending: FAMILY MEDICINE
Payer: MEDICARE

## 2022-06-29 ENCOUNTER — HOSPITAL ENCOUNTER (EMERGENCY)
Facility: OTHER | Age: 67
Discharge: HOME | End: 2022-06-29
Attending: FAMILY MEDICINE | Admitting: FAMILY MEDICINE
Payer: MEDICARE

## 2022-06-29 ENCOUNTER — APPOINTMENT (OUTPATIENT)
Dept: GENERAL RADIOLOGY | Facility: OTHER | Age: 67
End: 2022-06-29
Attending: FAMILY MEDICINE
Payer: MEDICARE

## 2022-06-29 VITALS
SYSTOLIC BLOOD PRESSURE: 129 MMHG | DIASTOLIC BLOOD PRESSURE: 89 MMHG | TEMPERATURE: 97.4 F | HEART RATE: 99 BPM | BODY MASS INDEX: 30.45 KG/M2 | RESPIRATION RATE: 18 BRPM | OXYGEN SATURATION: 97 % | WEIGHT: 194 LBS | HEIGHT: 67 IN

## 2022-06-29 DIAGNOSIS — S40.211A ABRASION OF RIGHT SHOULDER, INITIAL ENCOUNTER: ICD-10-CM

## 2022-06-29 DIAGNOSIS — S01.311A LACERATION OF AURICLE OF RIGHT EAR, INITIAL ENCOUNTER: ICD-10-CM
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
## 2022-06-29 DIAGNOSIS — S20.229A CONTUSION OF BACK, UNSPECIFIED LATERALITY, INITIAL ENCOUNTER: ICD-10-CM

## 2022-06-29 LAB
ABO/RH(D): NORMAL
ALBUMIN SERPL-MCNC: 4 G/DL (ref 3.5–5.7)
ALBUMIN UR-MCNC: NEGATIVE MG/DL
ALP SERPL-CCNC: 63 U/L (ref 34–104)
ALT SERPL W P-5'-P-CCNC: 23 U/L (ref 7–52)
ANION GAP SERPL CALCULATED.3IONS-SCNC: 8 MMOL/L (ref 3–14)
ANTIBODY SCREEN: NEGATIVE
APPEARANCE UR: CLEAR
APTT PPP: 30 SECONDS (ref 22–38)
AST SERPL W P-5'-P-CCNC: 34 U/L (ref 13–39)
BASOPHILS # BLD AUTO: 0 10E3/UL (ref 0–0.2)
BASOPHILS NFR BLD AUTO: 0 %
BILIRUB SERPL-MCNC: 0.8 MG/DL (ref 0.3–1)
BILIRUB UR QL STRIP: NEGATIVE
BUN SERPL-MCNC: 17 MG/DL (ref 7–25)
CALCIUM SERPL-MCNC: 9.5 MG/DL (ref 8.6–10.3)
CHLORIDE BLD-SCNC: 103 MMOL/L (ref 98–107)
CK SERPL-CCNC: 974 U/L (ref 30–223)
CO2 SERPL-SCNC: 26 MMOL/L (ref 21–31)
COLOR UR AUTO: YELLOW
CREAT SERPL-MCNC: 0.99 MG/DL (ref 0.7–1.3)
EOSINOPHIL # BLD AUTO: 0.2 10E3/UL (ref 0–0.7)
EOSINOPHIL NFR BLD AUTO: 1 %
ERYTHROCYTE [DISTWIDTH] IN BLOOD BY AUTOMATED COUNT: 13.1 % (ref 10–15)
ETHANOL SERPL-MCNC: 0.01 G/DL
FLUAV RNA SPEC QL NAA+PROBE: NEGATIVE
FLUBV RNA RESP QL NAA+PROBE: NEGATIVE
GFR SERPL CREATININE-BSD FRML MDRD: 83 ML/MIN/1.73M2
GLUCOSE BLD-MCNC: 143 MG/DL (ref 70–105)
GLUCOSE BLDC GLUCOMTR-MCNC: 122 MG/DL (ref 70–99)
GLUCOSE UR STRIP-MCNC: NEGATIVE MG/DL
HCT VFR BLD AUTO: 48.2 % (ref 40–53)
HGB BLD-MCNC: 16.3 G/DL (ref 13.3–17.7)
HGB UR QL STRIP: ABNORMAL
IMM GRANULOCYTES # BLD: 0.1 10E3/UL
IMM GRANULOCYTES NFR BLD: 1 %
INR PPP: 0.96 (ref 0.85–1.15)
KETONES UR STRIP-MCNC: NEGATIVE MG/DL
LACTATE SERPL-SCNC: 2 MMOL/L (ref 0.7–2)
LEUKOCYTE ESTERASE UR QL STRIP: NEGATIVE
LYMPHOCYTES # BLD AUTO: 1 10E3/UL (ref 0.8–5.3)
LYMPHOCYTES NFR BLD AUTO: 7 %
MCH RBC QN AUTO: 29.3 PG (ref 26.5–33)
MCHC RBC AUTO-ENTMCNC: 33.8 G/DL (ref 31.5–36.5)
MCV RBC AUTO: 87 FL (ref 78–100)
MONOCYTES # BLD AUTO: 0.7 10E3/UL (ref 0–1.3)
MONOCYTES NFR BLD AUTO: 5 %
MUCOUS THREADS #/AREA URNS LPF: PRESENT /LPF
NEUTROPHILS # BLD AUTO: 12.2 10E3/UL (ref 1.6–8.3)
NEUTROPHILS NFR BLD AUTO: 86 %
NITRATE UR QL: NEGATIVE
NRBC # BLD AUTO: 0 10E3/UL
NRBC BLD AUTO-RTO: 0 /100
PH UR STRIP: 7 [PH] (ref 5–9)
PLATELET # BLD AUTO: 253 10E3/UL (ref 150–450)
POTASSIUM BLD-SCNC: 4.7 MMOL/L (ref 3.5–5.1)
PROT SERPL-MCNC: 6.7 G/DL (ref 6.4–8.9)
RBC # BLD AUTO: 5.56 10E6/UL (ref 4.4–5.9)
RBC URINE: <1 /HPF
RSV RNA SPEC NAA+PROBE: NEGATIVE
SARS-COV-2 RNA RESP QL NAA+PROBE: NEGATIVE
SODIUM SERPL-SCNC: 137 MMOL/L (ref 134–144)
SP GR UR STRIP: 1.04 (ref 1–1.03)
SPECIMEN EXPIRATION DATE: NORMAL
UROBILINOGEN UR STRIP-MCNC: NORMAL MG/DL
WBC # BLD AUTO: 14.2 10E3/UL (ref 4–11)
WBC URINE: 1 /HPF

## 2022-06-29 PROCEDURE — 36415 COLL VENOUS BLD VENIPUNCTURE: CPT | Performed by: FAMILY MEDICINE

## 2022-06-29 PROCEDURE — 93005 ELECTROCARDIOGRAM TRACING: CPT | Performed by: FAMILY MEDICINE

## 2022-06-29 PROCEDURE — 80053 COMPREHEN METABOLIC PANEL: CPT | Performed by: FAMILY MEDICINE

## 2022-06-29 PROCEDURE — 76705 ECHO EXAM OF ABDOMEN: CPT | Mod: 26 | Performed by: FAMILY MEDICINE

## 2022-06-29 PROCEDURE — 99284 EMERGENCY DEPT VISIT MOD MDM: CPT | Performed by: FAMILY MEDICINE

## 2022-06-29 PROCEDURE — 82550 ASSAY OF CK (CPK): CPT | Performed by: FAMILY MEDICINE

## 2022-06-29 PROCEDURE — 72170 X-RAY EXAM OF PELVIS: CPT

## 2022-06-29 PROCEDURE — 87637 SARSCOV2&INF A&B&RSV AMP PRB: CPT | Performed by: FAMILY MEDICINE

## 2022-06-29 PROCEDURE — 86850 RBC ANTIBODY SCREEN: CPT | Performed by: FAMILY MEDICINE

## 2022-06-29 PROCEDURE — 81003 URINALYSIS AUTO W/O SCOPE: CPT | Performed by: FAMILY MEDICINE

## 2022-06-29 PROCEDURE — 82077 ASSAY SPEC XCP UR&BREATH IA: CPT | Performed by: FAMILY MEDICINE

## 2022-06-29 PROCEDURE — G0390 TRAUMA RESPONS W/HOSP CRITI: HCPCS | Performed by: FAMILY MEDICINE

## 2022-06-29 PROCEDURE — 74177 CT ABD & PELVIS W/CONTRAST: CPT | Mod: MA

## 2022-06-29 PROCEDURE — 85610 PROTHROMBIN TIME: CPT | Performed by: FAMILY MEDICINE

## 2022-06-29 PROCEDURE — 85025 COMPLETE CBC W/AUTO DIFF WBC: CPT | Performed by: FAMILY MEDICINE

## 2022-06-29 PROCEDURE — 99292 CRITICAL CARE ADDL 30 MIN: CPT | Performed by: FAMILY MEDICINE

## 2022-06-29 PROCEDURE — C9803 HOPD COVID-19 SPEC COLLECT: HCPCS | Performed by: FAMILY MEDICINE

## 2022-06-29 PROCEDURE — 93010 ELECTROCARDIOGRAM REPORT: CPT | Performed by: INTERNAL MEDICINE

## 2022-06-29 PROCEDURE — 83605 ASSAY OF LACTIC ACID: CPT | Performed by: FAMILY MEDICINE

## 2022-06-29 PROCEDURE — 70450 CT HEAD/BRAIN W/O DYE: CPT | Mod: MA

## 2022-06-29 PROCEDURE — 99291 CRITICAL CARE FIRST HOUR: CPT | Mod: 25 | Performed by: FAMILY MEDICINE

## 2022-06-29 PROCEDURE — 72132 CT LUMBAR SPINE W/DYE: CPT | Mod: MA

## 2022-06-29 PROCEDURE — 71045 X-RAY EXAM CHEST 1 VIEW: CPT

## 2022-06-29 PROCEDURE — 72125 CT NECK SPINE W/O DYE: CPT | Mod: MA

## 2022-06-29 PROCEDURE — 85730 THROMBOPLASTIN TIME PARTIAL: CPT | Performed by: FAMILY MEDICINE

## 2022-06-29 PROCEDURE — 86901 BLOOD TYPING SEROLOGIC RH(D): CPT | Performed by: FAMILY MEDICINE

## 2022-06-29 PROCEDURE — 258N000003 HC RX IP 258 OP 636: Performed by: FAMILY MEDICINE

## 2022-06-29 PROCEDURE — 76705 ECHO EXAM OF ABDOMEN: CPT | Mod: TC | Performed by: FAMILY MEDICINE

## 2022-06-29 PROCEDURE — 73030 X-RAY EXAM OF SHOULDER: CPT | Mod: RT

## 2022-06-29 PROCEDURE — 250N000011 HC RX IP 250 OP 636: Performed by: FAMILY MEDICINE

## 2022-06-29 PROCEDURE — 72129 CT CHEST SPINE W/DYE: CPT | Mod: MA

## 2022-06-29 RX ORDER — LIDOCAINE 40 MG/G
CREAM TOPICAL
Status: DISCONTINUED | OUTPATIENT
Start: 2022-06-29 | End: 2022-06-29 | Stop reason: HOSPADM

## 2022-06-29 RX ORDER — SODIUM CHLORIDE, SODIUM LACTATE, POTASSIUM CHLORIDE, CALCIUM CHLORIDE 600; 310; 30; 20 MG/100ML; MG/100ML; MG/100ML; MG/100ML
INJECTION, SOLUTION INTRAVENOUS CONTINUOUS
Status: DISCONTINUED | OUTPATIENT
Start: 2022-06-29 | End: 2022-06-29 | Stop reason: HOSPADM

## 2022-06-29 RX ORDER — HYDROMORPHONE HCL IN WATER/PF 6 MG/30 ML
0.2 PATIENT CONTROLLED ANALGESIA SYRINGE INTRAVENOUS
Status: DISCONTINUED | OUTPATIENT
Start: 2022-06-29 | End: 2022-06-29 | Stop reason: HOSPADM

## 2022-06-29 RX ORDER — CYCLOBENZAPRINE HCL 10 MG
10 TABLET ORAL 3 TIMES DAILY PRN
Qty: 20 TABLET | Refills: 0 | Status: SHIPPED | OUTPATIENT
Start: 2022-06-29 | End: 2022-07-06

## 2022-06-29 RX ORDER — IOPAMIDOL 755 MG/ML
100 INJECTION, SOLUTION INTRAVASCULAR ONCE
Status: COMPLETED | OUTPATIENT
Start: 2022-06-29 | End: 2022-06-29

## 2022-06-29 RX ADMIN — IOPAMIDOL 100 ML: 755 INJECTION, SOLUTION INTRAVENOUS at 11:48

## 2022-06-29 RX ADMIN — SODIUM CHLORIDE, POTASSIUM CHLORIDE, SODIUM LACTATE AND CALCIUM CHLORIDE: 600; 310; 30; 20 INJECTION, SOLUTION INTRAVENOUS at 12:40

## 2022-06-29 RX ADMIN — SODIUM CHLORIDE, POTASSIUM CHLORIDE, SODIUM LACTATE AND CALCIUM CHLORIDE 1000 ML: 600; 310; 30; 20 INJECTION, SOLUTION INTRAVENOUS at 11:21

## 2022-06-29 ASSESSMENT — ENCOUNTER SYMPTOMS
ABDOMINAL PAIN: 0
SHORTNESS OF BREATH: 0
BACK PAIN: 1
CHILLS: 0

## 2022-06-29 NOTE — ED PROVIDER NOTES
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   Accident caused by farm tractor, initial encounter   Contusion of back, unspecified laterality, initial encounter   Abrasion of right shoulder, initial encounter   Laceration of auricle of right ear, initial encounter       6/29/2022   [facility]     Chris Macias MD  06/29/22 1471       Chris Macias MD  06/29/22 [id number]

## 2022-06-29 NOTE — ED TRIAGE NOTES
"Pt arrived to unit via EMS. Report received is pt was hit by a tractor. Pt was doing maintenance on trails and shoveling dirt. Pt remembers \"having the wind knocked out of me.\" Pt was struck by tractor from backside. Pt states \"the tractor was on me. There were a few others who helped get it off of me.\" Pt denies losing consciousness. Denies having hit his head and taking any blood thinners. Pt reports it was a small tractor. Complains of pain in R shoulder and back, tender lateral left side. Pt has bleeding noted in Right Ear and multiple abrasions on superior back. Pt arrived wearing C-collar, IV in L AC, received 100mcg of fentanyl and 1mg of versed prior to arrival. Pt into bay 1, partial trauma alert was called. MDA at bedside right away to perform fast exam. VSS, pt declined any medication for pain. Appears to not be in distress.     Triage Assessment     Row Name 06/29/22 1113       Triage Assessment (Adult)    Airway WDL WDL       Skin Circulation/Temperature WDL    Skin Circulation/Temperature WDL WDL       Cardiac WDL    Cardiac WDL WDL       Peripheral/Neurovascular WDL    Peripheral Neurovascular WDL X;neurovascular assessment upper       RUE Neurovascular Assessment    Temperature RUE warm    Color RUE no discoloration    Sensation RUE numbness present       Cognitive/Neuro/Behavioral WDL    Cognitive/Neuro/Behavioral WDL WDL              "

## 2022-06-30 LAB
ATRIAL RATE - MUSE: 78 BPM
DIASTOLIC BLOOD PRESSURE - MUSE: NORMAL MMHG
INTERPRETATION ECG - MUSE: NORMAL
P AXIS - MUSE: 35 DEGREES
PR INTERVAL - MUSE: 150 MS
QRS DURATION - MUSE: 92 MS
QT - MUSE: 382 MS
QTC - MUSE: 435 MS
R AXIS - MUSE: -14 DEGREES
SYSTOLIC BLOOD PRESSURE - MUSE: NORMAL MMHG
T AXIS - MUSE: 41 DEGREES
VENTRICULAR RATE- MUSE: 78 BPM

## 2022-07-13 ENCOUNTER — OFFICE VISIT (OUTPATIENT)
Dept: INTERNAL MEDICINE | Facility: OTHER | Age: 67
End: 2022-07-13
Attending: INTERNAL MEDICINE
Payer: COMMERCIAL

## 2022-07-13 VITALS
TEMPERATURE: 98.7 F | DIASTOLIC BLOOD PRESSURE: 76 MMHG | BODY MASS INDEX: 29.98 KG/M2 | SYSTOLIC BLOOD PRESSURE: 124 MMHG | RESPIRATION RATE: 16 BRPM | HEART RATE: 88 BPM | WEIGHT: 188.6 LBS | OXYGEN SATURATION: 99 %

## 2022-07-13 DIAGNOSIS — T14.90XA TRAUMA: Primary | ICD-10-CM

## 2022-07-13 DIAGNOSIS — I63.81 LACUNAR INFARCTION (H): ICD-10-CM

## 2022-07-13 PROCEDURE — G0463 HOSPITAL OUTPT CLINIC VISIT: HCPCS

## 2022-07-13 PROCEDURE — 99214 OFFICE O/P EST MOD 30 MIN: CPT | Performed by: INTERNAL MEDICINE

## 2022-07-13 RX ORDER — CYCLOBENZAPRINE HCL 10 MG
10 TABLET ORAL 3 TIMES DAILY PRN
COMMUNITY
End: 2022-11-22

## 2022-07-13 RX ORDER — ROSUVASTATIN CALCIUM 10 MG/1
10 TABLET, COATED ORAL DAILY
Qty: 90 TABLET | Refills: 3 | Status: SHIPPED | OUTPATIENT
Start: 2022-07-13 | End: 2023-06-27

## 2022-07-13 ASSESSMENT — ENCOUNTER SYMPTOMS
CARDIOVASCULAR NEGATIVE: 1
RESPIRATORY NEGATIVE: 1
CONSTITUTIONAL NEGATIVE: 1

## 2022-07-13 ASSESSMENT — PAIN SCALES - GENERAL: PAINLEVEL: MILD PAIN (2)

## 2022-07-13 NOTE — NURSING NOTE
"Chief Complaint   Patient presents with     Follow Up     ER follow up  GITONO montenegro rolled on him  6-29-22       Medication reconciliation completed.    FOOD SECURITY SCREENING QUESTIONS:    The next two questions are to help us understand your food security.  If you are feeling you need any assistance in this area, we have resources available to support you today.    Hunger Vital Signs:  Within the past 12 months we worried whether our food would run out before we got money to buy more. Never  Within the past 12 months the food we bought just didn't last and we didn't have money to get more. Never    Initial /76 (BP Location: Right arm, Patient Position: Sitting, Cuff Size: Adult Regular)   Pulse 88   Temp 98.7  F (37.1  C) (Temporal)   Resp 16   Wt 85.5 kg (188 lb 9.6 oz)   SpO2 99%   BMI 29.98 kg/m   Estimated body mass index is 29.98 kg/m  as calculated from the following:    Height as of 6/29/22: 1.689 m (5' 6.5\").    Weight as of this encounter: 85.5 kg (188 lb 9.6 oz).       Tatyana Maciel LPN .......  7/13/2022  11:14 AM  "

## 2022-07-13 NOTE — PROGRESS NOTES
ICD-10-CM    1. Trauma  T14.90XA    2. Lacunar infarction (H)  I63.81 rosuvastatin (CRESTOR) 10 MG tablet           Hortencia Salazar is a 67 year old accompanied by his spouse, presenting for the following health issues:  Follow Up (ER follow up  GICH  tractor rolled on him  6-29-22)      He is here for follow-up visit from the emergency room.  He was injured when a tractor rolled back and struck him.  He had a complete trauma evaluation in the emergency room which was miraculously unremarkable with no fractures or other significant injury.  He still has some aches and pains and he has a swelling in his right groin area.  He is hoping that it is not a recurrent hernia.  He is using Flexeril at night to help him sleep.  He thinks that in general he is healing albeit slowly.    We talked about his CT scan of the head.  It showed an old lacunar infarct.  Because of this, I did recommend to him that he be on statin therapy as well as aspirin therapy.  He is in agreement.  He is no longer smoking and tells me that he will not start smoking again.    History of Present Illness       Back Pain:  He presents for follow up of back pain. Patient's back pain is a new problem.    Original cause of back pain: other  First noticed back pain: 1-4 weeks ago  Patient feels back pain: comes and goesLocation of back pain:  Right upper back  Description of back pain: dull ache and stabbing  Back pain spreads: right shoulder    Since patient first noticed back pain, pain is: always present, but gets better and worse  Does back pain interfere with his job:  Not applicable  On a scale of 1-10 (10 being the worst), patient describes pain as:  5  What makes back pain worse: bending, coughing and lying down  Acupuncture: not tried  Acetaminophen: not tried  Activity or exercise: not tried  Chiropractor:  Not tried  Cold: helpful  Heat: not tried  Massage: not tried  Muscle relaxants: helpful  NSAIDS: helpful  Opioids: not tried  Physical  Therapy: not tried  Rest: helpful  Steroid Injection: not tried  Stretching: not tried  Surgery: not tried  TENS unit: not tried  Topical pain relievers: helpful  Other healthcare providers patient is seeing for back pain: None    He eats 2-3 servings of fruits and vegetables daily.He consumes 0 sweetened beverage(s) daily.He exercises with enough effort to increase his heart rate 9 or less minutes per day.  He exercises with enough effort to increase his heart rate 4 days per week.   He is taking medications regularly.       ED/UC Followup:    Facility:  [facility]  Date of visit: 6-29-22   Reason for visit: tractor rolled on him  Current Status: better   Right shoulder and rib pain         Review of Systems   Constitutional: Negative.    Respiratory: Negative.    Cardiovascular: Negative.             Objective    /76 (BP Location: Right arm, Patient Position: Sitting, Cuff Size: Adult Regular)   Pulse 88   Temp 98.7  F (37.1  C) (Temporal)   Resp 16   Wt 85.5 kg (188 lb 9.6 oz)   SpO2 99%   BMI 29.98 kg/m    Body mass index is 29.98 kg/m .  Physical Exam  Vitals and nursing note reviewed.   Constitutional:       General: He is not in acute distress.     Appearance: Normal appearance. He is not ill-appearing, toxic-appearing or diaphoretic.   Genitourinary:     Comments: Right groin area reveals probably a hematoma in the right inguinal ligament area.  No definite hernia.  Some mild ecchymosis is present in the upper groin and on the scrotum.  No other abnormalities.  Neurological:      Mental Status: He is alert.                            .  ..

## 2022-09-17 ENCOUNTER — HEALTH MAINTENANCE LETTER (OUTPATIENT)
Age: 67
End: 2022-09-17

## 2022-11-02 ENCOUNTER — IMMUNIZATION (OUTPATIENT)
Dept: FAMILY MEDICINE | Facility: OTHER | Age: 67
End: 2022-11-02
Attending: INTERNAL MEDICINE
Payer: MEDICARE

## 2022-11-02 DIAGNOSIS — Z23 NEED FOR PROPHYLACTIC VACCINATION AND INOCULATION AGAINST INFLUENZA: Primary | ICD-10-CM

## 2022-11-02 DIAGNOSIS — Z23 HIGH PRIORITY FOR 2019-NCOV VACCINE: ICD-10-CM

## 2022-11-02 PROCEDURE — 0124A COVID-19,PF,PFIZER BOOSTER BIVALENT: CPT

## 2022-11-02 PROCEDURE — G0008 ADMIN INFLUENZA VIRUS VAC: HCPCS

## 2022-11-22 ENCOUNTER — OFFICE VISIT (OUTPATIENT)
Dept: INTERNAL MEDICINE | Facility: OTHER | Age: 67
End: 2022-11-22
Attending: INTERNAL MEDICINE
Payer: COMMERCIAL

## 2022-11-22 VITALS
HEIGHT: 66 IN | DIASTOLIC BLOOD PRESSURE: 76 MMHG | SYSTOLIC BLOOD PRESSURE: 126 MMHG | WEIGHT: 186.6 LBS | BODY MASS INDEX: 29.99 KG/M2 | OXYGEN SATURATION: 98 % | TEMPERATURE: 97.8 F | HEART RATE: 85 BPM | RESPIRATION RATE: 24 BRPM

## 2022-11-22 DIAGNOSIS — I63.81 LACUNAR STROKE (H): ICD-10-CM

## 2022-11-22 DIAGNOSIS — N52.8 OTHER MALE ERECTILE DYSFUNCTION: ICD-10-CM

## 2022-11-22 DIAGNOSIS — I25.10 CORONARY ARTERY CALCIFICATION SEEN ON CAT SCAN: ICD-10-CM

## 2022-11-22 DIAGNOSIS — R91.8 MASS OF UPPER LOBE OF RIGHT LUNG: ICD-10-CM

## 2022-11-22 DIAGNOSIS — N40.1 BENIGN PROSTATIC HYPERPLASIA WITH LOWER URINARY TRACT SYMPTOMS, SYMPTOM DETAILS UNSPECIFIED: ICD-10-CM

## 2022-11-22 DIAGNOSIS — K21.9 GASTROESOPHAGEAL REFLUX DISEASE WITHOUT ESOPHAGITIS: Primary | ICD-10-CM

## 2022-11-22 DIAGNOSIS — J43.8 OTHER EMPHYSEMA (H): ICD-10-CM

## 2022-11-22 DIAGNOSIS — F52.8 OTHER SEXUAL DYSFUNCTION NOT DUE TO A SUBSTANCE OR KNOWN PHYSIOLOGICAL CONDITION: ICD-10-CM

## 2022-11-22 DIAGNOSIS — D11.9 WARTHIN'S TUMOR: ICD-10-CM

## 2022-11-22 PROBLEM — Z72.0 TOBACCO ABUSE: Status: RESOLVED | Noted: 2018-02-23 | Resolved: 2022-11-22

## 2022-11-22 LAB
ALBUMIN SERPL BCG-MCNC: 4.4 G/DL (ref 3.5–5.2)
ALP SERPL-CCNC: 98 U/L (ref 40–129)
ALT SERPL W P-5'-P-CCNC: 21 U/L (ref 10–50)
ANION GAP SERPL CALCULATED.3IONS-SCNC: 9 MMOL/L (ref 7–15)
AST SERPL W P-5'-P-CCNC: 19 U/L (ref 10–50)
BILIRUB SERPL-MCNC: 0.7 MG/DL
BUN SERPL-MCNC: 17.5 MG/DL (ref 8–23)
CALCIUM SERPL-MCNC: 9.3 MG/DL (ref 8.8–10.2)
CHLORIDE SERPL-SCNC: 101 MMOL/L (ref 98–107)
CHOLEST SERPL-MCNC: 128 MG/DL
CREAT SERPL-MCNC: 0.89 MG/DL (ref 0.67–1.17)
DEPRECATED HCO3 PLAS-SCNC: 28 MMOL/L (ref 22–29)
GFR SERPL CREATININE-BSD FRML MDRD: >90 ML/MIN/1.73M2
GLUCOSE SERPL-MCNC: 89 MG/DL (ref 70–99)
HDLC SERPL-MCNC: 41 MG/DL
LDLC SERPL CALC-MCNC: 66 MG/DL
NONHDLC SERPL-MCNC: 87 MG/DL
POTASSIUM SERPL-SCNC: 4.3 MMOL/L (ref 3.4–5.3)
PROT SERPL-MCNC: 7.3 G/DL (ref 6.4–8.3)
PSA SERPL-MCNC: 1.96 NG/ML (ref 0–4.5)
SODIUM SERPL-SCNC: 138 MMOL/L (ref 136–145)
TRIGL SERPL-MCNC: 103 MG/DL

## 2022-11-22 PROCEDURE — 80053 COMPREHEN METABOLIC PANEL: CPT | Mod: ZL | Performed by: INTERNAL MEDICINE

## 2022-11-22 PROCEDURE — 36415 COLL VENOUS BLD VENIPUNCTURE: CPT | Mod: ZL | Performed by: INTERNAL MEDICINE

## 2022-11-22 PROCEDURE — 99213 OFFICE O/P EST LOW 20 MIN: CPT | Mod: 25 | Performed by: INTERNAL MEDICINE

## 2022-11-22 PROCEDURE — G0439 PPPS, SUBSEQ VISIT: HCPCS | Performed by: INTERNAL MEDICINE

## 2022-11-22 PROCEDURE — 80061 LIPID PANEL: CPT | Mod: ZL | Performed by: INTERNAL MEDICINE

## 2022-11-22 PROCEDURE — 82040 ASSAY OF SERUM ALBUMIN: CPT | Mod: ZL | Performed by: INTERNAL MEDICINE

## 2022-11-22 PROCEDURE — 90677 PCV20 VACCINE IM: CPT

## 2022-11-22 PROCEDURE — 84153 ASSAY OF PSA TOTAL: CPT | Mod: ZL | Performed by: INTERNAL MEDICINE

## 2022-11-22 PROCEDURE — G0463 HOSPITAL OUTPT CLINIC VISIT: HCPCS | Mod: 25

## 2022-11-22 RX ORDER — SILDENAFIL 100 MG/1
100 TABLET, FILM COATED ORAL DAILY PRN
Qty: 10 TABLET | Refills: 5 | Status: SHIPPED | OUTPATIENT
Start: 2022-11-22 | End: 2024-01-30

## 2022-11-22 ASSESSMENT — ENCOUNTER SYMPTOMS
DYSURIA: 0
JOINT SWELLING: 0
HEADACHES: 0
ARTHRALGIAS: 1
HEMATOCHEZIA: 0
MYALGIAS: 0
DIARRHEA: 0
NERVOUS/ANXIOUS: 0
SORE THROAT: 0
COUGH: 0
FREQUENCY: 1
PALPITATIONS: 0
HEMATURIA: 0
NAUSEA: 0
EYE PAIN: 0
CONSTIPATION: 0
DIZZINESS: 0
CHILLS: 0
SHORTNESS OF BREATH: 1
ABDOMINAL PAIN: 0
FEVER: 0
PARESTHESIAS: 0
HEARTBURN: 1
WEAKNESS: 0

## 2022-11-22 ASSESSMENT — ACTIVITIES OF DAILY LIVING (ADL): CURRENT_FUNCTION: NO ASSISTANCE NEEDED

## 2022-11-22 NOTE — NURSING NOTE
Patient is here for medicare wellness.     Medication Reconciliation: unable or not appropriate to perform      Blanca Harris LPN 11/22/2022 1:08 PM       Advance care directive on file? no  Advance care directive provided to patient? no       Blanca Harris LPN

## 2022-11-22 NOTE — PATIENT INSTRUCTIONS
Continue with your current medications.    Blood tests today, I will send you a MyChart letter with the results.    Pneumonia shot given today.    You need to follow-up with Dr. Ortiz sometime in January or so.    You will need another CT scan of your chest in January or so.    Work hard on a healthy diet, exercise and weight loss.    If all goes well, follow-up annually.

## 2022-11-22 NOTE — PROGRESS NOTES
"SUBJECTIVE:   Martin is a 67 year old who presents for Preventive Visit.    This patient is here today for Medicare wellness visit.  In most respects he tells me that he feels fine.  He has a history of COPD from previous cigarette smoking but no longer smokes.  He thinks his breathing is stable and he does not need any specific treatment or intervention.  He does have an abnormal CT scan on lung cancer screening he will be due for another CT in January.  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  He has seen ENT.  He will be due for a 1 year follow-up with ENT in January as well.    He has reflux disease and takes omeprazole on a daily basis.  He has a history of coronary artery calcification and is on low intensity statin therapy.  He does not have any cardiac symptoms or concerns.  He has a history of adenomatous colonic polyps but is up-to-date with colonoscopy.  He has a previous history of a lacunar infarct seen on CT of the brain, he is on the statin and aspirin therapy with no further issues.  He does have some lower urinary tract symptoms with erectile dysfunction, he uses Viagra on a as needed basis.    Medications are reconciled.  Past medical history, past surgical history, family history and social histories are reviewed and updated.  Immunizations are reviewed, he is due for a Prevnar.    Are you in the first 12 months of your Medicare coverage?  No    Healthy Habits:     In general, how would you rate your overall health?  Good    Frequency of exercise:  None    Do you usually eat at least 4 servings of fruit and vegetables a day, include whole grains    & fiber and avoid regularly eating high fat or \"junk\" foods?  No    Taking medications regularly:  Yes    Medication side effects:  None    Ability to successfully perform activities of daily living:  No assistance needed    Home Safety:  No safety concerns identified    Hearing Impairment:  Difficulty following a " conversation in a noisy restaurant or crowded room and need to ask people to speak up or repeat themselves    In the past 6 months, have you been bothered by leaking of urine?  No    In general, how would you rate your overall mental or emotional health?  Good      PHQ-2 Total Score: 0    Additional concerns today:  No      Have you ever done Advance Care Planning? (For example, a Health Directive, POLST, or a discussion with a medical provider or your loved ones about your wishes): No, advance care planning information given to patient to review.  Advanced care planning was discussed at today's visit.       Fall risk  Fallen 2 or more times in the past year?: No  Any fall with injury in the past year?: No    Cognitive Screening   1) Repeat 3 items (Leader, Season, Table)    2) Clock draw: NORMAL  3) 3 item recall: Recalls 3 objects  Results: 3 items recalled: COGNITIVE IMPAIRMENT LESS LIKELY    Mini-CogTM Copyright S Juwan. Licensed by the author for use in [facility]; reprinted with permission (mely@[facility]). All rights reserved.      Do you have sleep apnea, excessive snoring or daytime drowsiness?: no    Reviewed and updated as needed this visit by clinical staff   Tobacco  Allergies  Meds  Problems  Med Hx  Surg Hx  Fam Hx  Soc   Hx        Reviewed and updated as needed this visit by Provider   Tobacco  Allergies  Meds  Problems  Med Hx  Surg Hx  Fam Hx         Social History     Tobacco Use     Smoking status: Former     Packs/day: 0.25     Years: 40.00     Pack years: 10.00     Types: Cigarettes     Quit date: 2021     Years since quittin.0     Smokeless tobacco: Never   Substance Use Topics     Alcohol use: No     Comment: Alcoholic Drinks/day: rare use.         Alcohol Use 2022   Prescreen: >3 drinks/day or >7 drinks/week? Not Applicable   Prescreen: >3 drinks/day or >7 drinks/week? -           Current Outpatient Medications   Medication     Ascorbic Acid (VITAMIN  C PO)     aspirin (ASA) 81 MG EC tablet     ELDERBERRY PO     nicotine (NICORETTE) 2 MG gum     omeprazole (PRILOSEC) 20 MG CR capsule     rosuvastatin (CRESTOR) 10 MG tablet     sildenafil (VIAGRA) 100 MG tablet     VITAMIN D PO     No current facility-administered medications for this visit.         Current providers sharing in care for this patient include:   Patient Care Team:  Obi Khan MD as PCP - General (Internal Medicine)  Obi Khan MD as Assigned PCP    The following health maintenance items are reviewed in Epic and correct as of today:  Health Maintenance   Topic Date Due     DTAP/TDAP/TD IMMUNIZATION (1 - Tdap) 05/06/2003     Pneumococcal Vaccine: 65+ Years (2 - PCV) 10/11/2022     LUNG CANCER SCREENING  06/29/2023     MEDICARE ANNUAL WELLNESS VISIT  11/22/2023     FALL RISK ASSESSMENT  11/22/2023     COLORECTAL CANCER SCREENING  11/22/2024     LIPID  10/11/2026     ADVANCE CARE PLANNING  11/22/2027     SPIROMETRY  Completed     HEPATITIS C SCREENING  Completed     PHQ-2 (once per calendar year)  Completed     INFLUENZA VACCINE  Completed     ZOSTER IMMUNIZATION  Completed     AORTIC ANEURYSM SCREENING (SYSTEM ASSIGNED)  Completed     COVID-19 Vaccine  Completed     COPD ACTION PLAN  Addressed     IPV IMMUNIZATION  Aged Out     MENINGITIS IMMUNIZATION  Aged Out     Lab work is in process          Review of Systems   Constitutional: Negative for chills and fever.   HENT: Negative for congestion, ear pain, hearing loss and sore throat.    Eyes: Negative for pain and visual disturbance.   Respiratory: Positive for shortness of breath. Negative for cough.    Cardiovascular: Negative for chest pain, palpitations and peripheral edema.   Gastrointestinal: Positive for heartburn. Negative for abdominal pain, constipation, diarrhea, hematochezia and nausea.   Genitourinary: Positive for frequency, impotence and urgency. Negative for dysuria, genital sores, hematuria and penile discharge.  "  Musculoskeletal: Positive for arthralgias. Negative for joint swelling and myalgias.   Skin: Negative for rash.   Neurological: Negative for dizziness, weakness, headaches and paresthesias.   Psychiatric/Behavioral: Negative for mood changes. The patient is not nervous/anxious.      Constitutional, HEENT, cardiovascular, pulmonary, GI, , musculoskeletal, neuro, skin, endocrine and psych systems are negative, except as otherwise noted.    OBJECTIVE:   /76   Pulse 85   Temp 97.8  F (36.6  C) (Temporal)   Resp 24   Ht 1.676 m (5' 6\")   Wt 84.6 kg (186 lb 9.6 oz)   SpO2 98%   BMI 30.12 kg/m   Estimated body mass index is 30.12 kg/m  as calculated from the following:    Height as of this encounter: 1.676 m (5' 6\").    Weight as of this encounter: 84.6 kg (186 lb 9.6 oz).  Physical Exam  GENERAL: healthy, alert and no distress  EYES: Eyes grossly normal to inspection, PERRL and conjunctivae and sclerae normal  HENT: ear canals and TM's normal, nose and mouth without ulcers or lesions.  Right parotid mass palpated.  NECK: no adenopathy, no asymmetry, masses, or scars and thyroid normal to palpation  RESP: lungs clear to auscultation - no rales, rhonchi or wheezes.  Slightly decreased breath sounds  CV: regular rate and rhythm, normal S1 S2, no S3 or S4, no murmur, click or rub, no peripheral edema and peripheral pulses strong  ABDOMEN: soft, nontender, no hepatosplenomegaly, no masses and bowel sounds normal  : Normal male, no hernia.  Prostate 2+ without nodularity  MS: no gross musculoskeletal defects noted, no edema  SKIN: no suspicious lesions or rashes  NEURO: Normal strength and tone, mentation intact and speech normal  PSYCH: mentation appears normal, affect normal/bright        ASSESSMENT / PLAN:       ICD-10-CM    1. Gastroesophageal reflux disease without esophagitis  K21.9       2. Warthin's tumor  D11.9       3. Coronary artery calcification seen on CAT scan  I25.10 Comprehensive metabolic " "panel     Lipid Panel      4. Other emphysema (H)  J43.8       5. Mass of upper lobe of right lung  R91.8       6. Psychosexual dysfunction with inhibited sexual excitement  F52.8 Prostate Specific Antigen      7. Benign prostatic hyperplasia with lower urinary tract symptoms, symptom details unspecified  N40.1 Prostate Specific Antigen      8. Other male erectile dysfunction  N52.8 sildenafil (VIAGRA) 100 MG tablet      9. Lacunar stroke (H)  I63.81           Patient has been advised of split billing requirements and indicates understanding: Yes      COUNSELING:    Overall he appears to be stable and doing well.  Medications will continue without change.  I again asked him about the need for inhalers, etc. and he did not feel that he needed anything.  Complete lab drawn and pending, I will send him a letter with the results.  Prevnar given today.  Because of his abnormal chest CT he will need another follow-up CT scan in January of next year.  He will also need to see Dr. Ortiz for follow-up on the parotid mass in January or so of next year.  Encouraged him to work hard on a healthy diet, exercise and weight loss.  If all goes well, follow-up annually  Reviewed preventive health counseling, as reflected in patient instructions       Regular exercise       Healthy diet/nutrition      BMI:   Estimated body mass index is 30.12 kg/m  as calculated from the following:    Height as of this encounter: 1.676 m (5' 6\").    Weight as of this encounter: 84.6 kg (186 lb 9.6 oz).   Weight management plan: Discussed healthy diet and exercise guidelines      He reports that he quit smoking about a year ago. His smoking use included cigarettes. He has a 10.00 pack-year smoking history. He has never used smokeless tobacco.      Appropriate preventive services were discussed with this patient, including applicable screening as appropriate for cardiovascular disease, diabetes, osteopenia/osteoporosis, and glaucoma.  As appropriate " for age/gender, discussed screening for colorectal cancer, prostate cancer, breast cancer, and cervical cancer. Checklist reviewing preventive services available has been given to the patient.    Reviewed patients plan of care and provided an AVS. The Basic Care Plan (routine screening as documented in Health Maintenance) for Martin meets the Care Plan requirement. This Care Plan has been established and reviewed with the Patient.      TAWNY RODGERS MD  [facility]  Review current opioid prescriptions    For a patient with a current opioid prescription:    Reviewed potential Opioid Use Disorder (OUD) risk factors:      Evaluate their pain severity and current treatment plan:     Provide information on non-opioid treatment options:    Refer to a specialist, as appropriate:    Get more information on pain management in the HHS Pain Management Best Practices Inter-agency Task Force Report    Screen for potential Substance Use Disorders (SUDs)    Reviewed the patient s potential risk factors for SUDs:      Refer to treatment or specialist, as appropriate:     A screening tool isn t required but you may use one:  Find more information in the National [employer] on Drug Abuse Screening and Assessment Tools Chart    Identified Health Risks:

## 2022-11-22 NOTE — LETTER
November 22, 2022      Martin Bryant  [address] [address] [address]        Dear Martin,    Below are the results of your recent labs:    Results for orders placed or performed in visit on 11/22/22   Comprehensive metabolic panel     Status: Normal   Result Value Ref Range    Sodium 138 136 - 145 mmol/L    Potassium 4.3 3.4 - 5.3 mmol/L    Chloride 101 98 - 107 mmol/L    Carbon Dioxide (CO2) 28 22 - 29 mmol/L    Anion Gap 9 7 - 15 mmol/L    Urea Nitrogen 17.5 8.0 - 23.0 mg/dL    Creatinine 0.89 0.67 - 1.17 mg/dL    Calcium 9.3 8.8 - 10.2 mg/dL    Glucose 89 70 - 99 mg/dL    Alkaline Phosphatase 98 40 - 129 U/L    AST 19 10 - 50 U/L    ALT 21 10 - 50 U/L    Protein Total 7.3 6.4 - 8.3 g/dL    Albumin 4.4 3.5 - 5.2 g/dL    Bilirubin Total 0.7 <=1.2 mg/dL    GFR Estimate >90 >60 mL/min/1.73m2   Lipid Panel     Status: Normal   Result Value Ref Range    Cholesterol 128 <200 mg/dL    Triglycerides 103 <150 mg/dL    Direct Measure HDL 41 >=40 mg/dL    LDL Cholesterol Calculated 66 <=100 mg/dL    Non HDL Cholesterol 87 <130 mg/dL    Narrative    Cholesterol  Desirable:  <200 mg/dL    Triglycerides  Normal:  Less than 150 mg/dL  Borderline High:  150-199 mg/dL  High:  200-499 mg/dL  Very High:  Greater than or equal to 500 mg/dL    Direct Measure HDL  Female:  Greater than or equal to 50 mg/dL   Male:  Greater than or equal to 40 mg/dL    LDL Cholesterol  Desirable:  <100mg/dL  Above Desirable:  100-129 mg/dL   Borderline High:  130-159 mg/dL   High:  160-189 mg/dL   Very High:  >= 190 mg/dL    Non HDL Cholesterol  Desirable:  130 mg/dL  Above Desirable:  130-159 mg/dL  Borderline High:  160-189 mg/dL  High:  190-219 mg/dL  Very High:  Greater than or equal to 220 mg/dL   Prostate Specific Antigen     Status: Normal   Result Value Ref Range    PSA Tumor Marker 1.96 0.00 - 4.50 ng/mL    Narrative    This result is obtained using the Roche Elecsys total PSA method on the ga [id number] immunoassay analyzer.  Results obtained with different assay methods or kits cannot be used interchangeably.        Your blood tests are completely normal.  Congratulations on this excellent report.    Sincerely,        Obi Khan MD  Internal Medicine  [facility]

## 2023-02-15 ENCOUNTER — MYC MEDICAL ADVICE (OUTPATIENT)
Dept: INTERNAL MEDICINE | Facility: OTHER | Age: 68
End: 2023-02-15
Payer: COMMERCIAL

## 2023-02-15 DIAGNOSIS — Z87.891 PERSONAL HISTORY OF TOBACCO USE: ICD-10-CM

## 2023-02-15 DIAGNOSIS — R91.8 MASS OF UPPER LOBE OF RIGHT LUNG: Primary | ICD-10-CM

## 2023-02-15 NOTE — TELEPHONE ENCOUNTER
He does have an abnormal CT scan on lung cancer screening he will be due for another CT in January    Per office note from 11/22/22. Requesting orders for this.  Rachana Padilla RN on 2/15/2023 at 9:53 AM

## 2023-02-20 ENCOUNTER — HOSPITAL ENCOUNTER (OUTPATIENT)
Dept: CT IMAGING | Facility: OTHER | Age: 68
Discharge: HOME | End: 2023-02-20
Attending: INTERNAL MEDICINE | Admitting: INTERNAL MEDICINE
Payer: MEDICARE

## 2023-02-20 DIAGNOSIS — R91.8 MASS OF UPPER LOBE OF RIGHT LUNG: ICD-10-CM

## 2023-02-20 DIAGNOSIS — Z87.891 PERSONAL HISTORY OF TOBACCO USE: ICD-10-CM

## 2023-02-20 PROCEDURE — 71250 CT THORAX DX C-: CPT | Mod: ME

## 2023-06-27 ENCOUNTER — OFFICE VISIT (OUTPATIENT)
Dept: INTERNAL MEDICINE | Facility: OTHER | Age: 68
End: 2023-06-27
Attending: STUDENT IN AN ORGANIZED HEALTH CARE EDUCATION/TRAINING PROGRAM
Payer: COMMERCIAL

## 2023-06-27 VITALS
TEMPERATURE: 97.9 F | BODY MASS INDEX: 30.01 KG/M2 | DIASTOLIC BLOOD PRESSURE: 82 MMHG | SYSTOLIC BLOOD PRESSURE: 130 MMHG | OXYGEN SATURATION: 94 % | WEIGHT: 191.2 LBS | RESPIRATION RATE: 18 BRPM | HEIGHT: 67 IN | HEART RATE: 88 BPM

## 2023-06-27 DIAGNOSIS — N52.8 OTHER MALE ERECTILE DYSFUNCTION: ICD-10-CM

## 2023-06-27 DIAGNOSIS — K21.9 GASTROESOPHAGEAL REFLUX DISEASE WITHOUT ESOPHAGITIS: ICD-10-CM

## 2023-06-27 DIAGNOSIS — R91.8 MASS OF UPPER LOBE OF RIGHT LUNG: Primary | ICD-10-CM

## 2023-06-27 DIAGNOSIS — I63.81 LACUNAR INFARCTION (H): ICD-10-CM

## 2023-06-27 PROCEDURE — G0463 HOSPITAL OUTPT CLINIC VISIT: HCPCS

## 2023-06-27 PROCEDURE — 99214 OFFICE O/P EST MOD 30 MIN: CPT | Performed by: STUDENT IN AN ORGANIZED HEALTH CARE EDUCATION/TRAINING PROGRAM

## 2023-06-27 RX ORDER — ROSUVASTATIN CALCIUM 10 MG/1
10 TABLET, COATED ORAL DAILY
Qty: 90 TABLET | Refills: 4 | Status: SHIPPED | OUTPATIENT
Start: 2023-06-27 | End: 2024-01-30

## 2023-06-27 RX ORDER — SILDENAFIL 100 MG/1
100 TABLET, FILM COATED ORAL DAILY PRN
Qty: 10 TABLET | Refills: 5 | Status: CANCELLED | OUTPATIENT
Start: 2023-06-27

## 2023-06-27 RX ORDER — TADALAFIL 5 MG/1
5 TABLET ORAL EVERY 24 HOURS
Qty: 90 TABLET | Refills: 3 | Status: SHIPPED | OUTPATIENT
Start: 2023-06-27 | End: 2024-01-30

## 2023-06-27 ASSESSMENT — PAIN SCALES - GENERAL: PAINLEVEL: NO PAIN (0)

## 2023-06-27 NOTE — PROGRESS NOTES
Assessment & Plan         ICD-10-CM    1. Mass of upper lobe of right lung  R91.8 CT Chest w/o Contrast      2. Lacunar infarction (H)  I63.81 rosuvastatin (CRESTOR) 10 MG tablet      3. Other male erectile dysfunction  N52.8 tadalafil (CIALIS) 5 MG tablet      4. Gastroesophageal reflux disease without esophagitis  K21.9 omeprazole (PRILOSEC) 20 MG DR capsule        Lung lesion: due for 6 month follow up ct scan. Will order for end of August.     Tell dysfunction: Not having suspect results with max dose sildenafil.  We will try tadalafil 5 mg daily as it should hopefully help with his BPH symptoms as well.  He will contact me if it is too expensive and I will represcribe sildenafil 100 mg as needed or refer to urology.    GERD: Refill omeprazole, taking daily.    Prior stroke: Appropriately on statin and baby aspirin.  Continue both of these.      Patient will get his tetanus booster from the pharmacy.      No follow-ups on file.    Gaudencio Payan MD  [facility]      Hortencia Salazar is a 68 year old, presenting for the following health issues:  Recheck Medication (Refills and est care)         No data to display              History of Present Illness       Reason for visit:  Prescription refills    He eats 4 or more servings of fruits and vegetables daily.He consumes 1 sweetened beverage(s) daily.He exercises with enough effort to increase his heart rate 9 or less minutes per day.  He exercises with enough effort to increase his heart rate 3 or less days per week.   He is taking medications regularly.       Medication check and refills   Est Care    Erectile dysfunction: Utilizing sildenafil but not having as hard of erections as he would like.  Has not used it in quite a while due to poor effect.  Wondering if there is anything else he can try.    Prior chest CT scan performed this winter recommending CT in 6 months per former PCP.  Discussed this with patient and ordered  "today.        Review of Systems         Objective    /82 (BP Location: Right arm, Patient Position: Sitting, Cuff Size: Adult Regular)   Pulse 88   Temp 97.9  F (36.6  C) (Temporal)   Resp 18   Ht 1.689 m (5' 6.5\")   Wt 86.7 kg (191 lb 3.2 oz)   SpO2 94%   BMI 30.40 kg/m    Body mass index is 30.4 kg/m .  Physical Exam   General: Pleasant 68-year-old man sitting in clinic no acute distress                "

## 2023-06-27 NOTE — NURSING NOTE
"Chief Complaint   Patient presents with     Recheck Medication     Refills and est care       Medication reconciliation completed.    FOOD SECURITY SCREENING QUESTIONS:    The next two questions are to help us understand your food security.  If you are feeling you need any assistance in this area, we have resources available to support you today.    Hunger Vital Signs:  Within the past 12 months we worried whether our food would run out before we got money to buy more. Never  Within the past 12 months the food we bought just didn't last and we didn't have money to get more. Never    Initial /82 (BP Location: Right arm, Patient Position: Sitting, Cuff Size: Adult Regular)   Pulse 88   Temp 97.9  F (36.6  C) (Temporal)   Resp 18   Ht 1.689 m (5' 6.5\")   Wt 86.7 kg (191 lb 3.2 oz)   SpO2 94%   BMI 30.40 kg/m   Estimated body mass index is 30.4 kg/m  as calculated from the following:    Height as of this encounter: 1.689 m (5' 6.5\").    Weight as of this encounter: 86.7 kg (191 lb 3.2 oz).       Tatyana Maciel LPN .......  6/27/2023  3:24 PM  "

## 2023-08-28 ENCOUNTER — HOSPITAL ENCOUNTER (OUTPATIENT)
Dept: CT IMAGING | Facility: OTHER | Age: 68
Discharge: HOME | End: 2023-08-28
Attending: STUDENT IN AN ORGANIZED HEALTH CARE EDUCATION/TRAINING PROGRAM | Admitting: STUDENT IN AN ORGANIZED HEALTH CARE EDUCATION/TRAINING PROGRAM
Payer: MEDICARE

## 2023-08-28 DIAGNOSIS — R91.8 MASS OF UPPER LOBE OF RIGHT LUNG: ICD-10-CM

## 2023-08-28 PROCEDURE — G1010 CDSM STANSON: HCPCS

## 2023-08-28 PROCEDURE — 71250 CT THORAX DX C-: CPT | Mod: ME

## 2023-10-20 ENCOUNTER — ALLIED HEALTH/NURSE VISIT (OUTPATIENT)
Dept: FAMILY MEDICINE | Facility: OTHER | Age: 68
End: 2023-10-20
Attending: STUDENT IN AN ORGANIZED HEALTH CARE EDUCATION/TRAINING PROGRAM
Payer: MEDICARE

## 2023-10-20 DIAGNOSIS — Z23 NEED FOR PROPHYLACTIC VACCINATION AND INOCULATION AGAINST INFLUENZA: Primary | ICD-10-CM

## 2023-10-20 DIAGNOSIS — Z23 HIGH PRIORITY FOR 2019-NCOV VACCINE: ICD-10-CM

## 2023-10-20 PROCEDURE — G0008 ADMIN INFLUENZA VIRUS VAC: HCPCS

## 2023-10-20 PROCEDURE — 91320 SARSCV2 VAC 30MCG TRS-SUC IM: CPT

## 2023-11-09 ENCOUNTER — PATIENT OUTREACH (OUTPATIENT)
Dept: GASTROENTEROLOGY | Facility: CLINIC | Age: 68
End: 2023-11-09
Payer: COMMERCIAL

## 2024-01-30 ENCOUNTER — OFFICE VISIT (OUTPATIENT)
Dept: INTERNAL MEDICINE | Facility: OTHER | Age: 69
End: 2024-01-30
Attending: STUDENT IN AN ORGANIZED HEALTH CARE EDUCATION/TRAINING PROGRAM
Payer: COMMERCIAL

## 2024-01-30 VITALS
WEIGHT: 193.6 LBS | DIASTOLIC BLOOD PRESSURE: 79 MMHG | HEIGHT: 66 IN | HEART RATE: 89 BPM | SYSTOLIC BLOOD PRESSURE: 128 MMHG | TEMPERATURE: 97.9 F | RESPIRATION RATE: 19 BRPM | OXYGEN SATURATION: 95 % | BODY MASS INDEX: 31.12 KG/M2

## 2024-01-30 DIAGNOSIS — K21.9 GASTROESOPHAGEAL REFLUX DISEASE WITHOUT ESOPHAGITIS: ICD-10-CM

## 2024-01-30 DIAGNOSIS — Z12.5 ENCOUNTER FOR SCREENING FOR MALIGNANT NEOPLASM OF PROSTATE: ICD-10-CM

## 2024-01-30 DIAGNOSIS — I63.81 LACUNAR INFARCTION (H): ICD-10-CM

## 2024-01-30 DIAGNOSIS — Z13.220 ENCOUNTER FOR SCREENING FOR LIPID DISORDER: ICD-10-CM

## 2024-01-30 DIAGNOSIS — Z00.00 MEDICARE ANNUAL WELLNESS VISIT, SUBSEQUENT: Primary | ICD-10-CM

## 2024-01-30 DIAGNOSIS — N52.8 OTHER MALE ERECTILE DYSFUNCTION: ICD-10-CM

## 2024-01-30 DIAGNOSIS — J43.8 OTHER EMPHYSEMA (H): ICD-10-CM

## 2024-01-30 DIAGNOSIS — R91.8 PULMONARY NODULES: ICD-10-CM

## 2024-01-30 DIAGNOSIS — Z86.0100 HISTORY OF COLONIC POLYPS: ICD-10-CM

## 2024-01-30 LAB
ALBUMIN SERPL BCG-MCNC: 4.3 G/DL (ref 3.5–5.2)
ALP SERPL-CCNC: 91 U/L (ref 40–150)
ALT SERPL W P-5'-P-CCNC: 28 U/L (ref 0–70)
ANION GAP SERPL CALCULATED.3IONS-SCNC: 7 MMOL/L (ref 7–15)
AST SERPL W P-5'-P-CCNC: 22 U/L (ref 0–45)
BILIRUB SERPL-MCNC: 0.7 MG/DL
BUN SERPL-MCNC: 21.3 MG/DL (ref 8–23)
CALCIUM SERPL-MCNC: 9.1 MG/DL (ref 8.8–10.2)
CHLORIDE SERPL-SCNC: 103 MMOL/L (ref 98–107)
CHOLEST SERPL-MCNC: 124 MG/DL
CREAT SERPL-MCNC: 0.84 MG/DL (ref 0.67–1.17)
DEPRECATED HCO3 PLAS-SCNC: 29 MMOL/L (ref 22–29)
EGFRCR SERPLBLD CKD-EPI 2021: >90 ML/MIN/1.73M2
ERYTHROCYTE [DISTWIDTH] IN BLOOD BY AUTOMATED COUNT: 13.2 % (ref 10–15)
FASTING STATUS PATIENT QL REPORTED: YES
GLUCOSE SERPL-MCNC: 93 MG/DL (ref 70–99)
HCT VFR BLD AUTO: 47.2 % (ref 40–53)
HDLC SERPL-MCNC: 36 MG/DL
HGB BLD-MCNC: 16 G/DL (ref 13.3–17.7)
LDLC SERPL CALC-MCNC: 69 MG/DL
MCH RBC QN AUTO: 28.7 PG (ref 26.5–33)
MCHC RBC AUTO-ENTMCNC: 33.9 G/DL (ref 31.5–36.5)
MCV RBC AUTO: 85 FL (ref 78–100)
NONHDLC SERPL-MCNC: 88 MG/DL
PLATELET # BLD AUTO: 258 10E3/UL (ref 150–450)
POTASSIUM SERPL-SCNC: 4.3 MMOL/L (ref 3.4–5.3)
PROT SERPL-MCNC: 7.4 G/DL (ref 6.4–8.3)
PSA SERPL DL<=0.01 NG/ML-MCNC: 2.05 NG/ML (ref 0–4.5)
RBC # BLD AUTO: 5.57 10E6/UL (ref 4.4–5.9)
SODIUM SERPL-SCNC: 139 MMOL/L (ref 135–145)
TRIGL SERPL-MCNC: 94 MG/DL
WBC # BLD AUTO: 7.7 10E3/UL (ref 4–11)

## 2024-01-30 PROCEDURE — 36415 COLL VENOUS BLD VENIPUNCTURE: CPT | Mod: ZL | Performed by: STUDENT IN AN ORGANIZED HEALTH CARE EDUCATION/TRAINING PROGRAM

## 2024-01-30 PROCEDURE — G0103 PSA SCREENING: HCPCS | Mod: ZL | Performed by: STUDENT IN AN ORGANIZED HEALTH CARE EDUCATION/TRAINING PROGRAM

## 2024-01-30 PROCEDURE — 85027 COMPLETE CBC AUTOMATED: CPT | Mod: ZL | Performed by: STUDENT IN AN ORGANIZED HEALTH CARE EDUCATION/TRAINING PROGRAM

## 2024-01-30 PROCEDURE — 80061 LIPID PANEL: CPT | Mod: ZL | Performed by: STUDENT IN AN ORGANIZED HEALTH CARE EDUCATION/TRAINING PROGRAM

## 2024-01-30 PROCEDURE — 80053 COMPREHEN METABOLIC PANEL: CPT | Mod: ZL | Performed by: STUDENT IN AN ORGANIZED HEALTH CARE EDUCATION/TRAINING PROGRAM

## 2024-01-30 PROCEDURE — G0439 PPPS, SUBSEQ VISIT: HCPCS | Performed by: STUDENT IN AN ORGANIZED HEALTH CARE EDUCATION/TRAINING PROGRAM

## 2024-01-30 RX ORDER — ROSUVASTATIN CALCIUM 10 MG/1
10 TABLET, COATED ORAL DAILY
Qty: 90 TABLET | Refills: 4 | Status: SHIPPED | OUTPATIENT
Start: 2024-01-30

## 2024-01-30 RX ORDER — TADALAFIL 10 MG/1
10 TABLET ORAL EVERY 24 HOURS
Qty: 90 TABLET | Refills: 4 | Status: SHIPPED | OUTPATIENT
Start: 2024-01-30

## 2024-01-30 ASSESSMENT — ENCOUNTER SYMPTOMS
DYSURIA: 0
CONSTIPATION: 0
SHORTNESS OF BREATH: 0
EYE PAIN: 0
NERVOUS/ANXIOUS: 0
NAUSEA: 0
ABDOMINAL PAIN: 0
HEARTBURN: 0
COUGH: 0
JOINT SWELLING: 0
ARTHRALGIAS: 0
FEVER: 0
DIARRHEA: 0
CHILLS: 0
WEAKNESS: 0
PARESTHESIAS: 0
FREQUENCY: 0
SORE THROAT: 0
PALPITATIONS: 0
HEMATURIA: 0
DIZZINESS: 0
MYALGIAS: 0
HEMATOCHEZIA: 0
HEADACHES: 0

## 2024-01-30 ASSESSMENT — ACTIVITIES OF DAILY LIVING (ADL): CURRENT_FUNCTION: NO ASSISTANCE NEEDED

## 2024-01-30 ASSESSMENT — PAIN SCALES - GENERAL: PAINLEVEL: NO PAIN (0)

## 2024-01-30 NOTE — LETTER
January 30, 2024      Martin Bryant  [address] [facility] [id number]        Dear ,    We are writing to inform you of your test results.    Your laboratory results are below.  Essentially everything is normal.  You screened negative for prostate cancer and your cholesterol is fantastic.  Keep up the good work and I will see you again in 1 year for a follow-up physical exam, sooner if needed.  Will let you know the results of your CT scan when I see it.    Resulted Orders   Comprehensive metabolic panel   Result Value Ref Range    Sodium 139 135 - 145 mmol/L      Comment:      Reference intervals for this test were updated on 09/26/2023 to more accurately reflect our healthy population. There may be differences in the flagging of prior results with similar values performed with this method. Interpretation of those prior results can be made in the context of the updated reference intervals.     Potassium 4.3 3.4 - 5.3 mmol/L    Carbon Dioxide (CO2) 29 22 - 29 mmol/L    Anion Gap 7 7 - 15 mmol/L    Urea Nitrogen 21.3 8.0 - 23.0 mg/dL    Creatinine 0.84 0.67 - 1.17 mg/dL    GFR Estimate >90 >60 mL/min/1.73m2    Calcium 9.1 8.8 - 10.2 mg/dL    Chloride 103 98 - 107 mmol/L    Glucose 93 70 - 99 mg/dL    Alkaline Phosphatase 91 40 - 150 U/L      Comment:      Reference intervals for this test were updated on 11/14/2023 to more accurately reflect our healthy population. There may be differences in the flagging of prior results with similar values performed with this method. Interpretation of those prior results can be made in the context of the updated reference intervals.    AST 22 0 - 45 U/L      Comment:      Reference intervals for this test were updated on 6/12/2023 to more accurately reflect our healthy population. There may be differences in the flagging of prior results with similar values performed with this method. Interpretation of those prior results can be made in the context of the  updated reference intervals.    ALT 28 0 - 70 U/L      Comment:      Reference intervals for this test were updated on 6/12/2023 to more accurately reflect our healthy population. There may be differences in the flagging of prior results with similar values performed with this method. Interpretation of those prior results can be made in the context of the updated reference intervals.      Protein Total 7.4 6.4 - 8.3 g/dL    Albumin 4.3 3.5 - 5.2 g/dL    Bilirubin Total 0.7 <=1.2 mg/dL   CBC with platelets   Result Value Ref Range    WBC Count 7.7 4.0 - 11.0 10e3/uL    RBC Count 5.57 4.40 - 5.90 10e6/uL    Hemoglobin 16.0 13.3 - 17.7 g/dL    Hematocrit 47.2 40.0 - 53.0 %    MCV 85 78 - 100 fL    MCH 28.7 26.5 - 33.0 pg    MCHC 33.9 31.5 - 36.5 g/dL    RDW 13.2 10.0 - 15.0 %    Platelet Count 258 150 - 450 10e3/uL   Prostate Specific Antigen Screen   Result Value Ref Range    Prostate Specific Antigen Screen 2.05 0.00 - 4.50 ng/mL    Narrative    This result is obtained using the Roche Elecsys total PSA method on the ga e601 immunoassay analyzer. Results obtained with different assay methods or kits cannot be used interchangeably.   Lipid Profile   Result Value Ref Range    Cholesterol 124 <200 mg/dL    Triglycerides 94 <150 mg/dL    Direct Measure HDL 36 (L) >=40 mg/dL    LDL Cholesterol Calculated 69 <=100 mg/dL    Non HDL Cholesterol 88 <130 mg/dL    Patient Fasting > 8hrs? Yes     Narrative    Cholesterol  Desirable:  <200 mg/dL    Triglycerides  Normal:  Less than 150 mg/dL  Borderline High:  150-199 mg/dL  High:  200-499 mg/dL  Very High:  Greater than or equal to 500 mg/dL    Direct Measure HDL  Female:  Greater than or equal to 50 mg/dL   Male:  Greater than or equal to 40 mg/dL    LDL Cholesterol  Desirable:  <100mg/dL  Above Desirable:  100-129 mg/dL   Borderline High:  130-159 mg/dL   High:  160-189 mg/dL   Very High:  >= 190 mg/dL    Non HDL Cholesterol  Desirable:  130 mg/dL  Above Desirable:  130-159  mg/dL  Borderline High:  160-189 mg/dL  High:  190-219 mg/dL  Very High:  Greater than or equal to 220 mg/dL       If you have any questions or concerns, please call the clinic at the number listed above.       Sincerely,      Gaudencio Payan MD

## 2024-01-30 NOTE — NURSING NOTE
"Chief Complaint   Patient presents with    Medicare Visit         Initial /79 (BP Location: Right arm, Patient Position: Sitting, Cuff Size: Adult Regular)   Pulse 89   Temp 97.9  F (36.6  C) (Temporal)   Resp 19   Ht 1.664 m (5' 5.5\")   Wt 87.8 kg (193 lb 9.6 oz)   SpO2 95%   BMI 31.73 kg/m   Estimated body mass index is 31.73 kg/m  as calculated from the following:    Height as of this encounter: 1.664 m (5' 5.5\").    Weight as of this encounter: 87.8 kg (193 lb 9.6 oz).       FOOD SECURITY SCREENING QUESTIONS:    The next two questions are to help us understand your food security.  If you are feeling you need any assistance in this area, we have resources available to support you today.    Hunger Vital Signs:  Within the past 12 months we worried whether our food would run out before we got money to buy more. Never  Within the past 12 months the food we bought just didn't last and we didn't have money to get more. Never  Prema Reeves LPN on 1/30/2024 at 12:48 PM     Prema Reeves   "

## 2024-02-01 ENCOUNTER — MYC MEDICAL ADVICE (OUTPATIENT)
Dept: INTERNAL MEDICINE | Facility: OTHER | Age: 69
End: 2024-02-01
Payer: COMMERCIAL

## 2024-02-01 NOTE — TELEPHONE ENCOUNTER
CMP drawn on 1/30/24 included Glucose of 93    Adding this to care gap.     Patient update on [facility].    Chaya Shore RN on 2/1/2024 at 2:02 PM

## 2024-02-07 ENCOUNTER — HOSPITAL ENCOUNTER (OUTPATIENT)
Dept: CT IMAGING | Facility: OTHER | Age: 69
Discharge: HOME | End: 2024-02-07
Attending: STUDENT IN AN ORGANIZED HEALTH CARE EDUCATION/TRAINING PROGRAM | Admitting: STUDENT IN AN ORGANIZED HEALTH CARE EDUCATION/TRAINING PROGRAM
Payer: MEDICARE

## 2024-02-07 DIAGNOSIS — R91.8 PULMONARY NODULES: ICD-10-CM

## 2024-02-07 PROCEDURE — 71250 CT THORAX DX C-: CPT | Mod: ME

## 2024-09-12 ENCOUNTER — HOSPITAL ENCOUNTER (OUTPATIENT)
Facility: OTHER | Age: 69
End: 2024-09-12
Attending: SURGERY | Admitting: SURGERY
Payer: MEDICARE

## 2024-09-12 DIAGNOSIS — Z12.11 ENCOUNTER FOR SCREENING COLONOSCOPY: Primary | ICD-10-CM

## 2024-09-12 RX ORDER — BISACODYL 5 MG/1
TABLET, DELAYED RELEASE ORAL
Qty: 2 TABLET | Refills: 0 | Status: SHIPPED | OUTPATIENT
Start: 2024-11-15

## 2024-09-12 RX ORDER — POLYETHYLENE GLYCOL 3350, SODIUM CHLORIDE, SODIUM BICARBONATE, POTASSIUM CHLORIDE 420; 11.2; 5.72; 1.48 G/4L; G/4L; G/4L; G/4L
4000 POWDER, FOR SOLUTION ORAL ONCE
Qty: 4000 ML | Refills: 0 | Status: SHIPPED | OUTPATIENT
Start: 2024-11-15 | End: 2024-11-15

## 2024-09-12 NOTE — TELEPHONE ENCOUNTER
Screening Questions for the Scheduling of Screening Colonoscopies   (If Colonoscopy is diagnostic, Provider should review the chart before scheduling.)  Are you younger than 45 or older than 80?  NO  Do you take aspirin or fish oil?  NO (if yes, tell patient to stop 1 week prior to Colonoscopy)  Do you take warfarin (Coumadin), clopidogrel (Plavix), apixaban (Eliquis), dabigatram (Pradaxa), rivaroxaban (Xarelto) or any blood thinner? NO  Do you take semaglutide (Ozempic or Wegovy), tirzepatide (Mounjaro or Zepbound), liraglutide (Victoza), or dulaglutide (Trulicity)? NO  Do you use oxygen or a CPAP at home?  NO  Do you have kidney disease? NO  Are you on dialysis? NO  Have you had a stroke or heart attack in the last year? NO  Have you had a stent in your heart or any blood vessel in the last year? NO  Have you had a transplant of any organ? NO  Have you had a colonoscopy or upper endoscopy (EGD) before? YES         When?  2021  Date of scheduled Colonoscopy. 11/22/2024  Provider ZANDER  [facility]

## 2024-11-05 NOTE — PROGRESS NOTES
Prior to immunization administration, verified patients identity using patient s name and date of birth. Please see Immunization Activity for additional information.     Is the patient's temperature normal (100.5 or less)? Yes     Patient MEETS CRITERIA. PROCEED with vaccine administration.      Patient instructed to remain in clinic for 15 minutes afterwards, and to report any adverse reactions.      Link to Ancillary Visit Immunization Standing Orders Kandy Napier RN on 11/6/2024 at 10:06 AM

## 2024-11-06 ENCOUNTER — IMMUNIZATION (OUTPATIENT)
Dept: FAMILY MEDICINE | Facility: OTHER | Age: 69
End: 2024-11-06
Payer: MEDICARE

## 2024-11-06 DIAGNOSIS — Z23 ENCOUNTER FOR IMMUNIZATION: Primary | ICD-10-CM

## 2024-11-06 PROCEDURE — G0008 ADMIN INFLUENZA VIRUS VAC: HCPCS

## 2024-11-06 PROCEDURE — 91320 SARSCV2 VAC 30MCG TRS-SUC IM: CPT

## 2024-11-06 PROCEDURE — 90480 ADMN SARSCOV2 VAC 1/ONLY CMP: CPT

## 2025-01-18 PROBLEM — F52.8 OTHER SEXUAL DYSFUNCTION NOT DUE TO A SUBSTANCE OR KNOWN PHYSIOLOGICAL CONDITION: Status: RESOLVED | Noted: 2018-02-23 | Resolved: 2025-01-18

## 2025-01-18 PROBLEM — Z86.0101 H/O ADENOMATOUS POLYP OF COLON: Status: ACTIVE | Noted: 2021-11-22

## 2025-01-18 RX ORDER — LIDOCAINE 40 MG/G
CREAM TOPICAL
Status: CANCELLED | OUTPATIENT
Start: 2025-01-18

## 2025-01-18 RX ORDER — SODIUM CHLORIDE, SODIUM LACTATE, POTASSIUM CHLORIDE, CALCIUM CHLORIDE 600; 310; 30; 20 MG/100ML; MG/100ML; MG/100ML; MG/100ML
INJECTION, SOLUTION INTRAVENOUS CONTINUOUS
Status: CANCELLED | OUTPATIENT
Start: 2025-01-18

## 2025-01-18 RX ORDER — ONDANSETRON 2 MG/ML
4 INJECTION INTRAMUSCULAR; INTRAVENOUS
Status: CANCELLED | OUTPATIENT
Start: 2025-01-18

## 2025-01-30 ENCOUNTER — PATIENT OUTREACH (OUTPATIENT)
Dept: GASTROENTEROLOGY | Facility: CLINIC | Age: 70
End: 2025-01-30
Payer: COMMERCIAL

## 2025-01-30 DIAGNOSIS — Z12.11 SPECIAL SCREENING FOR MALIGNANT NEOPLASMS, COLON: Primary | ICD-10-CM

## 2025-01-30 NOTE — PROGRESS NOTES
"Patients last colonoscopy on file was on 21 and was recommended to repeat in 3 years. Patient had colonoscopy scheduled for 25 but cancelled and wanted to wait to schedule however order is now . Will place new order and pt can schedule off new order when he is ready.  CRC Screening Colonoscopy Referral Review    Patient meets the inclusion criteria for screening colonoscopy standing order.    Ordering/Referring Provider:  Gaudencio Payan      BMI: Estimated body mass index is 31.73 kg/m  as calculated from the following:    Height as of 24: 1.664 m (5' 5.5\").    Weight as of 24: 87.8 kg (193 lb 9.6 oz).     Sedation:  Does patient have any of the following conditions affecting sedation?  No medical conditions affecting sedation.    Previous Scopes:  Any previous recommendations or follow up needs based on previous scope?  na / No recommendations.    Medical Concerns to Postpone Order:  Does patient have any of the following medical concerns that should postpone/delay colonoscopy referral?  No medical conditions affecting colonoscopy referral.    Final Referral Details:  Based on patient's medical history patient is appropriate for referral order with moderate sedation. If patient's BMI > 50 do not schedule in ASC.  "

## 2025-03-04 DIAGNOSIS — K21.9 GASTROESOPHAGEAL REFLUX DISEASE WITHOUT ESOPHAGITIS: ICD-10-CM

## 2025-03-04 DIAGNOSIS — I63.81 LACUNAR INFARCTION (H): ICD-10-CM

## 2025-03-04 RX ORDER — OMEPRAZOLE 20 MG/1
20 CAPSULE, DELAYED RELEASE ORAL DAILY
Qty: 90 CAPSULE | Refills: 0 | Status: SHIPPED | OUTPATIENT
Start: 2025-03-04

## 2025-03-04 RX ORDER — ROSUVASTATIN CALCIUM 10 MG/1
10 TABLET, COATED ORAL DAILY
Qty: 90 TABLET | Refills: 0 | Status: SHIPPED | OUTPATIENT
Start: 2025-03-04

## 2025-03-04 NOTE — TELEPHONE ENCOUNTER
[id number] sent Rx request for the following:      Requested Prescriptions   Pending Prescriptions Disp Refills    omeprazole (PRILOSEC) 20 MG DR capsule [Pharmacy Med Name: OMEPRAZOLE 20MG DR CAPSULE] 90 capsule 4     Sig: TAKE 1 CAPSULE (20 MG) BY MOUTH DAILY       PPI Protocol Failed - 3/4/2025  1:50 PM        Failed - Recent (12 mo) or future (90 days) visit within the authorizing provider's specialty     The patient must have completed an in-person or virtual visit within the past 12 months or has a future visit scheduled within the next 90 days with the authorizing provider s specialty.  Urgent care and e-visits do not qualify as an office visit for this protocol.       Last Prescription Date:   1/30/24  Last Fill Qty/Refills:         90, R-4        rosuvastatin (CRESTOR) 10 MG tablet [Pharmacy Med Name: ROSUVASTATIN 10MG TABLET] 90 tablet 4     Sig: TAKE 1 TABLET (10 MG) BY MOUTH DAILY       Antihyperlipidemic agents Failed - 3/4/2025  1:50 PM        Failed - LDL on file in the past 12 months        Failed - Recent (12 mo) or future (90 days) visit within the authorizing provider's specialty     The patient must have completed an in-person or virtual visit within the past 12 months or has a future visit scheduled within the next 90 days with the authorizing provider s specialty.  Urgent care and e-visits do not qualify as an office visit for this protocol.       Last Prescription Date:   1/30/24  Last Fill Qty/Refills:         90, R-4    Last Office Visit:              1/30/24 (px)   Future Office visit:            None    Unable to complete prescription refill per RN Medication Refill Policy.     Pt due for annaul. Routing to provider for refill consideration. Routing to Unit scheduling pool, to assist Pt in scheduling appointment.     Routing to covering provider for refill consideration, as PCP/provider is out of clinic >48 hours or Pt is completely out of medication and provider is out of  the clinic today.    Feliz Warren RN on 3/4/2025 at 1:51 PM

## 2025-03-08 SDOH — HEALTH STABILITY: PHYSICAL HEALTH: ON AVERAGE, HOW MANY DAYS PER WEEK DO YOU ENGAGE IN MODERATE TO STRENUOUS EXERCISE (LIKE A BRISK WALK)?: 0 DAYS

## 2025-03-08 SDOH — HEALTH STABILITY: PHYSICAL HEALTH: ON AVERAGE, HOW MANY MINUTES DO YOU ENGAGE IN EXERCISE AT THIS LEVEL?: 0 MIN

## 2025-03-08 ASSESSMENT — SOCIAL DETERMINANTS OF HEALTH (SDOH): HOW OFTEN DO YOU GET TOGETHER WITH FRIENDS OR RELATIVES?: THREE TIMES A WEEK

## 2025-03-09 ENCOUNTER — HEALTH MAINTENANCE LETTER (OUTPATIENT)
Age: 70
End: 2025-03-09

## 2025-03-12 ENCOUNTER — OFFICE VISIT (OUTPATIENT)
Dept: FAMILY MEDICINE | Facility: OTHER | Age: 70
End: 2025-03-12
Payer: MEDICARE

## 2025-03-12 VITALS
OXYGEN SATURATION: 96 % | HEART RATE: 87 BPM | DIASTOLIC BLOOD PRESSURE: 80 MMHG | HEIGHT: 67 IN | TEMPERATURE: 97.8 F | RESPIRATION RATE: 20 BRPM | WEIGHT: 201.4 LBS | BODY MASS INDEX: 31.61 KG/M2 | SYSTOLIC BLOOD PRESSURE: 120 MMHG

## 2025-03-12 DIAGNOSIS — I63.81 LACUNAR STROKE (H): ICD-10-CM

## 2025-03-12 DIAGNOSIS — K21.9 GASTROESOPHAGEAL REFLUX DISEASE WITHOUT ESOPHAGITIS: ICD-10-CM

## 2025-03-12 DIAGNOSIS — I63.81 LACUNAR INFARCTION (H): ICD-10-CM

## 2025-03-12 DIAGNOSIS — E78.5 DYSLIPIDEMIA: ICD-10-CM

## 2025-03-12 DIAGNOSIS — M19.011 PRIMARY OSTEOARTHRITIS OF RIGHT SHOULDER: ICD-10-CM

## 2025-03-12 DIAGNOSIS — J43.8 OTHER EMPHYSEMA (H): ICD-10-CM

## 2025-03-12 DIAGNOSIS — N52.8 OTHER MALE ERECTILE DYSFUNCTION: ICD-10-CM

## 2025-03-12 DIAGNOSIS — Z12.5 ENCOUNTER FOR SCREENING FOR MALIGNANT NEOPLASM OF PROSTATE: ICD-10-CM

## 2025-03-12 DIAGNOSIS — Z00.00 MEDICARE ANNUAL WELLNESS VISIT, SUBSEQUENT: Primary | ICD-10-CM

## 2025-03-12 DIAGNOSIS — R91.8 MASS OF UPPER LOBE OF RIGHT LUNG: ICD-10-CM

## 2025-03-12 LAB
ALBUMIN SERPL BCG-MCNC: 3.9 G/DL (ref 3.5–5.2)
ALP SERPL-CCNC: 81 U/L (ref 40–150)
ALT SERPL W P-5'-P-CCNC: 24 U/L (ref 0–70)
ANION GAP SERPL CALCULATED.3IONS-SCNC: 7 MMOL/L (ref 7–15)
AST SERPL W P-5'-P-CCNC: 23 U/L (ref 0–45)
BASOPHILS # BLD AUTO: 0.1 10E3/UL (ref 0–0.2)
BASOPHILS NFR BLD AUTO: 1 %
BILIRUB SERPL-MCNC: 0.7 MG/DL
BUN SERPL-MCNC: 20.2 MG/DL (ref 8–23)
CALCIUM SERPL-MCNC: 9.1 MG/DL (ref 8.8–10.4)
CHLORIDE SERPL-SCNC: 105 MMOL/L (ref 98–107)
CHOLEST SERPL-MCNC: 119 MG/DL
CREAT SERPL-MCNC: 0.94 MG/DL (ref 0.67–1.17)
EGFRCR SERPLBLD CKD-EPI 2021: 87 ML/MIN/1.73M2
EOSINOPHIL # BLD AUTO: 0.3 10E3/UL (ref 0–0.7)
EOSINOPHIL NFR BLD AUTO: 4 %
ERYTHROCYTE [DISTWIDTH] IN BLOOD BY AUTOMATED COUNT: 13.5 % (ref 10–15)
FASTING STATUS PATIENT QL REPORTED: YES
FASTING STATUS PATIENT QL REPORTED: YES
GLUCOSE SERPL-MCNC: 104 MG/DL (ref 70–99)
HCO3 SERPL-SCNC: 28 MMOL/L (ref 22–29)
HCT VFR BLD AUTO: 47.8 % (ref 40–53)
HDLC SERPL-MCNC: 41 MG/DL
HGB BLD-MCNC: 16.2 G/DL (ref 13.3–17.7)
IMM GRANULOCYTES # BLD: 0 10E3/UL
IMM GRANULOCYTES NFR BLD: 0 %
LDLC SERPL CALC-MCNC: 60 MG/DL
LYMPHOCYTES # BLD AUTO: 1 10E3/UL (ref 0.8–5.3)
LYMPHOCYTES NFR BLD AUTO: 14 %
MCH RBC QN AUTO: 29 PG (ref 26.5–33)
MCHC RBC AUTO-ENTMCNC: 33.9 G/DL (ref 31.5–36.5)
MCV RBC AUTO: 86 FL (ref 78–100)
MONOCYTES # BLD AUTO: 0.6 10E3/UL (ref 0–1.3)
MONOCYTES NFR BLD AUTO: 8 %
NEUTROPHILS # BLD AUTO: 5.1 10E3/UL (ref 1.6–8.3)
NEUTROPHILS NFR BLD AUTO: 72 %
NONHDLC SERPL-MCNC: 78 MG/DL
NRBC # BLD AUTO: 0 10E3/UL
NRBC BLD AUTO-RTO: 0 /100
PLATELET # BLD AUTO: 219 10E3/UL (ref 150–450)
POTASSIUM SERPL-SCNC: 4.7 MMOL/L (ref 3.4–5.3)
PROT SERPL-MCNC: 6.9 G/DL (ref 6.4–8.3)
PSA SERPL DL<=0.01 NG/ML-MCNC: 1.73 NG/ML (ref 0–6.5)
RBC # BLD AUTO: 5.58 10E6/UL (ref 4.4–5.9)
SODIUM SERPL-SCNC: 140 MMOL/L (ref 135–145)
TRIGL SERPL-MCNC: 91 MG/DL
WBC # BLD AUTO: 7.1 10E3/UL (ref 4–11)

## 2025-03-12 PROCEDURE — 82465 ASSAY BLD/SERUM CHOLESTEROL: CPT | Mod: ZL | Performed by: FAMILY MEDICINE

## 2025-03-12 PROCEDURE — 85014 HEMATOCRIT: CPT | Mod: ZL | Performed by: FAMILY MEDICINE

## 2025-03-12 PROCEDURE — 80061 LIPID PANEL: CPT | Mod: ZL | Performed by: FAMILY MEDICINE

## 2025-03-12 PROCEDURE — G0103 PSA SCREENING: HCPCS | Mod: ZL | Performed by: FAMILY MEDICINE

## 2025-03-12 PROCEDURE — 36415 COLL VENOUS BLD VENIPUNCTURE: CPT | Mod: ZL | Performed by: FAMILY MEDICINE

## 2025-03-12 PROCEDURE — 82435 ASSAY OF BLOOD CHLORIDE: CPT | Mod: ZL | Performed by: FAMILY MEDICINE

## 2025-03-12 PROCEDURE — G0463 HOSPITAL OUTPT CLINIC VISIT: HCPCS

## 2025-03-12 PROCEDURE — 82947 ASSAY GLUCOSE BLOOD QUANT: CPT | Mod: ZL | Performed by: FAMILY MEDICINE

## 2025-03-12 PROCEDURE — 85004 AUTOMATED DIFF WBC COUNT: CPT | Mod: ZL | Performed by: FAMILY MEDICINE

## 2025-03-12 PROCEDURE — 82040 ASSAY OF SERUM ALBUMIN: CPT | Mod: ZL | Performed by: FAMILY MEDICINE

## 2025-03-12 RX ORDER — OMEPRAZOLE 20 MG/1
20 CAPSULE, DELAYED RELEASE ORAL DAILY
Qty: 90 CAPSULE | Refills: 3 | Status: SHIPPED | OUTPATIENT
Start: 2025-03-12

## 2025-03-12 RX ORDER — ROSUVASTATIN CALCIUM 10 MG/1
10 TABLET, COATED ORAL DAILY
Qty: 90 TABLET | Refills: 3 | Status: SHIPPED | OUTPATIENT
Start: 2025-03-12

## 2025-03-12 RX ORDER — TADALAFIL 20 MG/1
20 TABLET ORAL DAILY PRN
Qty: 30 TABLET | Refills: 2 | Status: SHIPPED | OUTPATIENT
Start: 2025-03-12 | End: 2025-06-10

## 2025-03-12 ASSESSMENT — ENCOUNTER SYMPTOMS
EYE DISCHARGE: 1
SHORTNESS OF BREATH: 1
CONSTITUTIONAL NEGATIVE: 1
NEUROLOGICAL NEGATIVE: 1
MYALGIAS: 0
ARTHRALGIAS: 1
BRUISES/BLEEDS EASILY: 1
PSYCHIATRIC NEGATIVE: 1
NECK PAIN: 0
ALLERGIC/IMMUNOLOGIC NEGATIVE: 1
GASTROINTESTINAL NEGATIVE: 1
NECK STIFFNESS: 0
CARDIOVASCULAR NEGATIVE: 1

## 2025-03-12 ASSESSMENT — PAIN SCALES - GENERAL: PAINLEVEL_OUTOF10: NO PAIN (0)

## 2025-03-12 NOTE — NURSING NOTE
"Chief Complaint   Patient presents with    Medicare Visit    Establish Care     Previous PCP, Dr. Khan      Patient presents for Annual Medicare Wellness visit and to establish care with new provider.  Denies pain at this time.      Initial /80 (BP Location: Right arm, Patient Position: Sitting, Cuff Size: Adult Regular)   Pulse 87   Temp 97.8  F (36.6  C) (Temporal)   Resp 20   Ht 1.689 m (5' 6.5\")   Wt 91.4 kg (201 lb 6.4 oz)   SpO2 96%   BMI 32.02 kg/m   Estimated body mass index is 32.02 kg/m  as calculated from the following:    Height as of this encounter: 1.689 m (5' 6.5\").    Weight as of this encounter: 91.4 kg (201 lb 6.4 oz).  Medication Review: complete    The next two questions are to help us understand your food security.  If you are feeling you need any assistance in this area, we have resources available to support you today.          3/8/2025   SDOH- Food Insecurity   Within the past 12 months, did you worry that your food would run out before you got money to buy more? N   Within the past 12 months, did the food you bought just not last and you didn t have money to get more? N         Health Care Directive:  Patient does not have a Health Care Directive: Discussed advance care planning with patient; however, patient declined at this time.    Sujata Mcmanus LPN on 3/12/2025 at 8:36 AM        "

## 2025-03-12 NOTE — PROGRESS NOTES
"Preventive Care Visit  [facility]  Marcin Nova DO, Family Medicine  Mar 12, 2025      Assessment & Plan     Martin was seen today for medicare visit and establish care.    Diagnoses and all orders for this visit:    Medicare annual wellness visit, subsequent    Lacunar infarction (H)    Gastroesophageal reflux disease without esophagitis    Primary osteoarthritis of right shoulder    Other male erectile dysfunction    Mass of upper lobe of right lung    Lacunar stroke (H)       Patient seemed indifferent about treating his erectile dysfunction.  I did offer to treat with tadalafil 20 mg instead of the 10 mg to see if this had effect.  I will prescribe this.  I left it up to the patient to see if he wants to fill this prescription.  Refilled his other medications at this time.  And sent the patient for labs.  Recheck in 1 years time      BMI  Estimated body mass index is 32.02 kg/m  as calculated from the following:    Height as of this encounter: 1.689 m (5' 6.5\").    Weight as of this encounter: 91.4 kg (201 lb 6.4 oz).       Counseling  Appropriate preventive services were addressed with this patient via screening, questionnaire, or discussion as appropriate for fall prevention, nutrition, physical activity, Tobacco-use cessation, social engagement, weight loss and cognition.  Checklist reviewing preventive services available has been given to the patient.  Reviewed patient's diet, addressing concerns and/or questions.   The patient was provided with written information regarding signs of hearing loss.           No follow-ups on file.    Hortencia Salazar is a 70 year old, presenting for the following:  Medicare Visit and Establish Care (Previous PCP, Dr. Khan )        3/12/2025     8:30 AM   Additional Questions   Roomed by Sujata Mcmanus LPN           Presents for medical where wellness and for chronic conditions.  He states that the tadalafil at 10 mg is not helping with the " erectile dysfunction.  He states that his shoulders are arthritic and he cannot raise his hands above his head he wants to do nothing about this.  His other concern is that he has had abnormal findings on CTs of his lungs.  Lung nodules have been stable for greater than 3 years.  Other than this he has no other concerns.    History of Present Illness       Reason for visit:  Annual   He is taking medications regularly.            Advance Care Planning  Patient does not have a Health Care Directive: Discussed advance care planning with patient; however, patient declined at this time.      3/8/2025   General Health   How would you rate your overall physical health? Good   Feel stress (tense, anxious, or unable to sleep) Not at all         3/8/2025   Nutrition   Diet: Regular (no restrictions)         3/8/2025   Exercise   Days per week of moderate/strenous exercise 0 days   Average minutes spent exercising at this level 0 min   (!) EXERCISE CONCERN      3/8/2025   Social Factors   Frequency of gathering with friends or relatives Three times a week   Worry food won't last until get money to buy more No   Food not last or not have enough money for food? No   Do you have housing? (Housing is defined as stable permanent housing and does not include staying ouside in a car, in a tent, in an abandoned building, in an overnight shelter, or couch-surfing.) Yes   Are you worried about losing your housing? No   Lack of transportation? No   Unable to get utilities (heat,electricity)? No         3/12/2025   Fall Risk   Fallen 2 or more times in the past year? No   Trouble with walking or balance? No          3/8/2025   Activities of Daily Living- Home Safety   Needs help with the following daily activites None of the above   Safety concerns in the home None of the above         3/8/2025   Dental   Dentist two times every year? Yes         3/8/2025   Hearing Screening   Hearing concerns? (!) I NEED TO ASK PEOPLE TO SPEAK UP OR  REPEAT THEMSELVES.    (!) TROUBLE UNDERSTANDING SOFT OR WHISPERED SPEECH.       Multiple values from one day are sorted in reverse-chronological order         3/8/2025   Driving Risk Screening   Patient/family members have concerns about driving No         3/8/2025   General Alertness/Fatigue Screening   Have you been more tired than usual lately? No         3/8/2025   Urinary Incontinence Screening   Bothered by leaking urine in past 6 months No           Today's PHQ-2 Score:       3/12/2025     8:25 AM   PHQ-2 ( 1999 Pfizer)   Q1: Little interest or pleasure in doing things 0   Q2: Feeling down, depressed or hopeless 0   PHQ-2 Score 0    Q1: Little interest or pleasure in doing things Not at all   Q2: Feeling down, depressed or hopeless Not at all   PHQ-2 Score 0       Patient-reported           3/8/2025   Substance Use   Alcohol more than 3/day or more than 7/wk Not Applicable   Do you have a current opioid prescription? No   How severe/bad is pain from 1 to 10? 0/10 (No Pain)   Do you use any other substances recreationally? No     Social History     Tobacco Use    Smoking status: Former     Current packs/day: 0.00     Average packs/day: 0.3 packs/day for 40.0 years (10.0 ttl pk-yrs)     Types: Cigarettes     Start date: 11/9/1981     Quit date: 11/9/2021     Years since quitting: 3.3    Smokeless tobacco: Never   Vaping Use    Vaping status: Never Used   Substance Use Topics    Alcohol use: No     Comment: Alcoholic Drinks/day: rare use.    Drug use: No           3/8/2025   AAA Screening   Family history of Abdominal Aortic Aneurysm (AAA)? Unsure   ASCVD Risk   The ASCVD Risk score (Akshat JEROME, et al., 2019) failed to calculate for the following reasons:    Risk score cannot be calculated because patient has a medical history suggesting prior/existing ASCVD            Reviewed and updated as needed this visit by Provider   Tobacco  Allergies  Meds  Problems  Med Hx  Surg Hx  Fam Hx             Past Medical History:   Diagnosis Date    Coronary artery calcification seen on CAT scan     Epigastric pain     No Comments Provided    Gastroesophageal reflux disease without esophagitis     History of colonic polyps     Lacunar stroke (H)     Nicotine dependence, uncomplicated     No Comments Provided    Other emphysema (H)     Pulmonary nodules     Rosacea     No Comments Provided    Warthin's tumor     Parotid     Past Surgical History:   Procedure Laterality Date    COLONOSCOPY N/A 11/22/2021    Advanced tubular adenoma, F/U 3 years    HERNIA REPAIR Bilateral     HERNIORRHAPHY INGUINAL Left 08/23/2019    Procedure: Left HERNIa repair , INGUINAL, with Mesh;  Surgeon: Roderick Jackson MD;  Location: [facility] OR    PAROTIDECTOMY Left 2012     Current Outpatient Medications   Medication Sig Dispense Refill    Ascorbic Acid (VITAMIN C PO) Take 1 tablet by mouth daily      aspirin (ASA) 81 MG EC tablet Take 1 tablet (81 mg) by mouth daily      omeprazole (PRILOSEC) 20 MG DR capsule TAKE 1 CAPSULE (20 MG) BY MOUTH DAILY 90 capsule 0    rosuvastatin (CRESTOR) 10 MG tablet TAKE 1 TABLET (10 MG) BY MOUTH DAILY 90 tablet 0    VITAMIN D PO Take 1 tablet by mouth daily      bisacodyl (DULCOLAX) 5 MG EC tablet Take as directed by colonoscopy prep instructions 2 tablet 0    tadalafil (CIALIS) 10 MG tablet Take 1 tablet (10 mg) by mouth every 24 hours 90 tablet 4     No Known Allergies  Recent Labs   Lab Test 01/30/24  1322 11/22/22  1341 06/29/22  1114 10/11/21  0907 10/11/21  0907 05/11/18  2115 04/23/18  1033   LDL 69 66  --   --  125*  --   --    HDL 36* 41  --   --  37  --   --    TRIG 94 103  --   --  83  --   --    ALT 28 21 23   < > 14 13  --    CR 0.84 0.89 0.99   < > 0.89 1.26 0.87   GFRESTIMATED >90 >90 83   < > 89 58* 89   GFRESTBLACK  --   --   --   --   --  70 >90   POTASSIUM 4.3 4.3 4.7   < > 4.4 4.9 4.3    < > = values in this interval not displayed.      Current providers sharing in care for this patient  "include:  Patient Care Team:  No Ref-Primary, Physician as PCP - General  Provider, MD Romelia as Assigned PCP    The following health maintenance items are reviewed in Epic and correct as of today:  Health Maintenance   Topic Date Due    RSV VACCINE (1 - Risk 60-74 years 1-dose series) Never done    COLORECTAL CANCER SCREENING  11/22/2024    MEDICARE ANNUAL WELLNESS VISIT  01/30/2025    LIPID  01/30/2025    LUNG CANCER SCREENING  02/07/2025    COVID-19 Vaccine (8 - 2024-25 season) 05/06/2025    FALL RISK ASSESSMENT  03/12/2026    GLUCOSE  01/30/2027    ADVANCE CARE PLANNING  03/12/2030    DTAP/TDAP/TD IMMUNIZATION (2 - Td or Tdap) 06/27/2033    SPIROMETRY  Completed    HEPATITIS C SCREENING  Completed    PHQ-2 (once per calendar year)  Completed    INFLUENZA VACCINE  Completed    Pneumococcal Vaccine: 50+ Years  Completed    ZOSTER IMMUNIZATION  Completed    AORTIC ANEURYSM SCREENING (SYSTEM ASSIGNED)  Completed    COPD ACTION PLAN  Addressed    HPV IMMUNIZATION  Aged Out    MENINGITIS IMMUNIZATION  Aged Out       Review of Systems   Constitutional: Negative.    HENT: Negative.     Eyes:  Positive for discharge.   Respiratory:  Positive for shortness of breath (ex-smoker  copd  but not bad.).    Cardiovascular: Negative.    Gastrointestinal: Negative.    Genitourinary: Negative.    Musculoskeletal:  Positive for arthralgias. Negative for myalgias, neck pain and neck stiffness.   Skin: Negative.    Allergic/Immunologic: Negative.    Neurological: Negative.    Hematological:  Bruises/bleeds easily.   Psychiatric/Behavioral: Negative.           Objective    Exam  /80 (BP Location: Right arm, Patient Position: Sitting, Cuff Size: Adult Regular)   Pulse 87   Temp 97.8  F (36.6  C) (Temporal)   Resp 20   Ht 1.689 m (5' 6.5\")   Wt 91.4 kg (201 lb 6.4 oz)   SpO2 96%   BMI 32.02 kg/m     Estimated body mass index is 32.02 kg/m  as calculated from the following:    Height as of this encounter: 1.689 m (5' 6.5\").   "  Weight as of this encounter: 91.4 kg (201 lb 6.4 oz).    Physical Exam  Constitutional:       Appearance: Normal appearance.   HENT:      Head: Normocephalic.      Right Ear: Tympanic membrane and ear canal normal.      Left Ear: Tympanic membrane and ear canal normal.      Nose: Nose normal.      Mouth/Throat:      Mouth: Mucous membranes are moist.      Pharynx: Oropharynx is clear.   Eyes:      Conjunctiva/sclera: Conjunctivae normal.   Cardiovascular:      Rate and Rhythm: Normal rate and regular rhythm.      Heart sounds: Normal heart sounds.   Pulmonary:      Effort: Pulmonary effort is normal.      Breath sounds: Normal breath sounds.   Abdominal:      General: Abdomen is flat.      Palpations: Abdomen is soft.   Musculoskeletal:         General: Normal range of motion.      Cervical back: Neck supple.   Skin:     General: Skin is warm and dry.   Neurological:      General: No focal deficit present.      Mental Status: He is alert and oriented to person, place, and time. Mental status is at baseline.   Psychiatric:         Mood and Affect: Mood normal.         Behavior: Behavior normal.               3/12/2025   Mini Cog   Clock Draw Score 2 Normal   3 Item Recall 2 objects recalled   Mini Cog Total Score 4              Signed Electronically by: Marcin Nova DO

## (undated) DEVICE — PACK MAJOR LAPAROTOMY LF SBA15MLFCA

## (undated) DEVICE — SYR ENDO MARKER SPOT GI 5ML GIS-45

## (undated) DEVICE — BLADE CLIPPER 4406

## (undated) DEVICE — ENDO SNARE POLYPECTOMY CRESENT 20MM LOOP SD-221U-25

## (undated) DEVICE — TUBING SUCTION 10'X3/16" N510

## (undated) DEVICE — PREP SKIN SCRUB TRAY 4461A

## (undated) DEVICE — SU MONOCRYL 4-0 PS-2 27" UND Y426H

## (undated) DEVICE — GLOVE PROTEXIS POWDER FREE SMT 7.5  2D72PT75X

## (undated) DEVICE — ESU ENDO FORCEP BX HOT FD-210U

## (undated) DEVICE — SOL WATER 1500ML

## (undated) DEVICE — DRAIN PENROSE 1/2X12" SILICONE GR103

## (undated) DEVICE — ESU GROUND PAD ADULT W/CORD E7507

## (undated) DEVICE — SPONGE RAY-TEC 4X4" 7317

## (undated) DEVICE — COVER LIGHT HANDLE LT-F02

## (undated) DEVICE — DECANTER VIAL 2006S

## (undated) DEVICE — SU VICRYL 3-0 SH 27" UND J416H

## (undated) DEVICE — ENDO BRUSH CHANNEL MASTER CLEANING 2-4.2MM BW-412T

## (undated) DEVICE — ENDO KIT COMPLIANCE DYKENDOCMPLY

## (undated) DEVICE — ENDO TRAP POLYP E-TRAP 00711099

## (undated) DEVICE — SUCTION MANIFOLD NEPTUNE 2 SYS 4 PORT 0702-020-000

## (undated) DEVICE — SLEEVE COMPRESSION SCD KNEE MED 74022

## (undated) DEVICE — BLADE KNIFE SURG 15 371115

## (undated) DEVICE — ENDO NDL INJECTION DISP NM-200U-0423

## (undated) DEVICE — GLOVE PROTEXIS BLUE W/NEU-THERA 8.0  2D73EB80

## (undated) RX ORDER — SODIUM CHLORIDE, SODIUM LACTATE, POTASSIUM CHLORIDE, CALCIUM CHLORIDE 600; 310; 30; 20 MG/100ML; MG/100ML; MG/100ML; MG/100ML
INJECTION, SOLUTION INTRAVENOUS
Status: DISPENSED
Start: 2019-08-23

## (undated) RX ORDER — KETOROLAC TROMETHAMINE 15 MG/ML
INJECTION, SOLUTION INTRAMUSCULAR; INTRAVENOUS
Status: DISPENSED
Start: 2018-05-11

## (undated) RX ORDER — KETAMINE HYDROCHLORIDE 50 MG/ML
INJECTION, SOLUTION INTRAMUSCULAR; INTRAVENOUS
Status: DISPENSED
Start: 2019-08-23

## (undated) RX ORDER — FENTANYL CITRATE 50 UG/ML
INJECTION, SOLUTION INTRAMUSCULAR; INTRAVENOUS
Status: DISPENSED
Start: 2019-08-23

## (undated) RX ORDER — LIDOCAINE HYDROCHLORIDE 20 MG/ML
INJECTION, SOLUTION EPIDURAL; INFILTRATION; INTRACAUDAL; PERINEURAL
Status: DISPENSED
Start: 2021-11-22

## (undated) RX ORDER — METOCLOPRAMIDE HYDROCHLORIDE 5 MG/ML
INJECTION INTRAMUSCULAR; INTRAVENOUS
Status: DISPENSED
Start: 2019-08-23

## (undated) RX ORDER — IPRATROPIUM BROMIDE AND ALBUTEROL SULFATE 2.5; .5 MG/3ML; MG/3ML
SOLUTION RESPIRATORY (INHALATION)
Status: DISPENSED
Start: 2019-08-23

## (undated) RX ORDER — KETOROLAC TROMETHAMINE 30 MG/ML
INJECTION, SOLUTION INTRAMUSCULAR; INTRAVENOUS
Status: DISPENSED
Start: 2019-08-23

## (undated) RX ORDER — LIDOCAINE HYDROCHLORIDE 20 MG/ML
INJECTION, SOLUTION EPIDURAL; INFILTRATION; INTRACAUDAL; PERINEURAL
Status: DISPENSED
Start: 2019-08-23

## (undated) RX ORDER — SODIUM CHLORIDE 9 MG/ML
INJECTION, SOLUTION INTRAVENOUS
Status: DISPENSED
Start: 2018-05-11

## (undated) RX ORDER — PROPOFOL 10 MG/ML
INJECTION, EMULSION INTRAVENOUS
Status: DISPENSED
Start: 2019-08-23

## (undated) RX ORDER — CEFAZOLIN SODIUM 2 G/100ML
INJECTION, SOLUTION INTRAVENOUS
Status: DISPENSED
Start: 2019-08-23

## (undated) RX ORDER — ACETAMINOPHEN 10 MG/ML
INJECTION, SOLUTION INTRAVENOUS
Status: DISPENSED
Start: 2019-08-23

## (undated) RX ORDER — DEXAMETHASONE SODIUM PHOSPHATE 4 MG/ML
INJECTION, SOLUTION INTRA-ARTICULAR; INTRALESIONAL; INTRAMUSCULAR; INTRAVENOUS; SOFT TISSUE
Status: DISPENSED
Start: 2019-08-23

## (undated) RX ORDER — ACETAMINOPHEN 325 MG/1
TABLET ORAL
Status: DISPENSED
Start: 2019-08-23

## (undated) RX ORDER — BUPIVACAINE HYDROCHLORIDE AND EPINEPHRINE 5; 5 MG/ML; UG/ML
INJECTION, SOLUTION EPIDURAL; INTRACAUDAL; PERINEURAL
Status: DISPENSED
Start: 2019-08-23

## (undated) RX ORDER — FENTANYL CITRATE 50 UG/ML
INJECTION, SOLUTION INTRAMUSCULAR; INTRAVENOUS
Status: DISPENSED
Start: 2018-05-11

## (undated) RX ORDER — ONDANSETRON 2 MG/ML
INJECTION INTRAMUSCULAR; INTRAVENOUS
Status: DISPENSED
Start: 2018-05-11

## (undated) RX ORDER — ONDANSETRON 2 MG/ML
INJECTION INTRAMUSCULAR; INTRAVENOUS
Status: DISPENSED
Start: 2019-08-23

## (undated) RX ORDER — PROPOFOL 10 MG/ML
INJECTION, EMULSION INTRAVENOUS
Status: DISPENSED
Start: 2021-11-22